# Patient Record
Sex: MALE | Race: WHITE | NOT HISPANIC OR LATINO | Employment: OTHER | URBAN - METROPOLITAN AREA
[De-identification: names, ages, dates, MRNs, and addresses within clinical notes are randomized per-mention and may not be internally consistent; named-entity substitution may affect disease eponyms.]

---

## 2017-09-26 ENCOUNTER — OFFICE VISIT (OUTPATIENT)
Dept: ORTHOPEDICS | Facility: CLINIC | Age: 62
End: 2017-09-26
Payer: COMMERCIAL

## 2017-09-26 VITALS
HEIGHT: 68 IN | BODY MASS INDEX: 25.31 KG/M2 | DIASTOLIC BLOOD PRESSURE: 80 MMHG | HEART RATE: 78 BPM | SYSTOLIC BLOOD PRESSURE: 152 MMHG | WEIGHT: 167 LBS

## 2017-09-26 DIAGNOSIS — M25.531 ACUTE PAIN OF RIGHT WRIST: Primary | ICD-10-CM

## 2017-09-26 PROCEDURE — 99212 OFFICE O/P EST SF 10 MIN: CPT | Mod: ,,, | Performed by: ORTHOPAEDIC SURGERY

## 2017-09-26 PROCEDURE — 3008F BODY MASS INDEX DOCD: CPT | Mod: ,,, | Performed by: ORTHOPAEDIC SURGERY

## 2017-09-26 PROCEDURE — 73110 X-RAY EXAM OF WRIST: CPT | Mod: RT,,, | Performed by: ORTHOPAEDIC SURGERY

## 2017-09-26 RX ORDER — GABAPENTIN 300 MG/1
300 CAPSULE ORAL 2 TIMES DAILY
COMMUNITY
End: 2018-04-27 | Stop reason: SDUPTHER

## 2017-09-26 RX ORDER — TRAMADOL HYDROCHLORIDE 50 MG/1
50 TABLET ORAL EVERY 12 HOURS PRN
Status: ON HOLD | COMMUNITY
End: 2020-06-02

## 2017-09-26 NOTE — PROGRESS NOTES
Subjective:       Chief Complaint    Chief Complaint   Patient presents with    Wrist Pain     Pain started approx. on 9/10/17 with no known injury. Hx of arthrodesis of the radiocarpal joint right wrist with Synthes contoured wrist fusion on 1/17/2011 by Dr. Baez.  Area was very sensitive to touch and painful.  Still has some pain but it has lessened in the 2 weeks since he made his appointment.  No recent xrays.        HPI  Clifford Tolbert is a 61 y.o.  male who presentsPain in his right wrist which was initially 8/10 pain level but is now is now 0/10 pain level.       Past History  Past Medical History:   Diagnosis Date    Depression with anxiety     Neuritis     Peroneal tendinitis     Plantar fasciitis, left     Stiffness of right shoulder joint      Past Surgical History:   Procedure Laterality Date    ANKLE LIGAMENT RECONSTRUCTION Left 03/13/2008    SHOULDER ARTHROSCOPY W/ LABRAL REPAIR Right 02/08/2017    WRIST FUSION Right 01/17/2011     Social History     Social History    Marital status:      Spouse name: N/A    Number of children: N/A    Years of education: N/A     Occupational History    Not on file.     Social History Main Topics    Smoking status: Current Every Day Smoker     Types: Pipe    Smokeless tobacco: Never Used    Alcohol use Yes    Drug use: No    Sexual activity: Not on file     Other Topics Concern    Not on file     Social History Narrative    No narrative on file         Medications  Current Outpatient Prescriptions   Medication Sig    gabapentin (NEURONTIN) 300 MG capsule Take 300 mg by mouth 2 (two) times daily.    tramadol (ULTRAM) 50 mg tablet Take 50 mg by mouth every 12 (twelve) hours as needed for Pain.     No current facility-administered medications for this visit.        Allergies  Review of patient's allergies indicates:   Allergen Reactions    Tetracyclines Hives         Review of Systems     Constitutional: Negative    HENT: Negative  Eyes:  Negative  Respiratory: Negative  Cardiovascular: Negative  Musculoskeletal: HPI  Skin: Negative  Neurological: Negative  Hematological: Negative  Endocrine: Negative      Physical Exam    Vitals:    09/26/17 1141   BP: (!) 152/80   Pulse: 78     Physical Examination:Right wrist shows solid arthrodesis. Midline scar. The site of maximal pain was at the wrist joint level directly over the plate. There is no swelling or redness. He did have a little area of skin avulsion between the fourth and fifth metacarpal heads that healed up uneventfully. Don't see any connection.     Skin-clear  General appearance -  well appearing, and in no distress  Mental status - awake  Neck - supple  Chest -  symmetric air entry  Heart - normal rate   Abdomen - soft      Assessment/Plan   Acute pain of right wrist  -     X-Ray Wrist Complete Right      The next time the pain recurs, he will call me directly so we can see him sooner.         This note was dictated using voice recognition software and may contain grammatical errors.

## 2018-03-07 ENCOUNTER — OFFICE VISIT (OUTPATIENT)
Dept: ORTHOPEDICS | Facility: CLINIC | Age: 63
End: 2018-03-07
Payer: COMMERCIAL

## 2018-03-07 VITALS
SYSTOLIC BLOOD PRESSURE: 142 MMHG | DIASTOLIC BLOOD PRESSURE: 78 MMHG | HEIGHT: 68 IN | BODY MASS INDEX: 25.31 KG/M2 | WEIGHT: 167 LBS | HEART RATE: 74 BPM

## 2018-03-07 DIAGNOSIS — M65.332 TRIGGER MIDDLE FINGER OF LEFT HAND: Primary | ICD-10-CM

## 2018-03-07 PROCEDURE — 99214 OFFICE O/P EST MOD 30 MIN: CPT | Mod: 25,,, | Performed by: ORTHOPAEDIC SURGERY

## 2018-03-07 PROCEDURE — 20550 NJX 1 TENDON SHEATH/LIGAMENT: CPT | Mod: F2,,, | Performed by: ORTHOPAEDIC SURGERY

## 2018-03-07 RX ORDER — TRIAMCINOLONE ACETONIDE 40 MG/ML
40 INJECTION, SUSPENSION INTRA-ARTICULAR; INTRAMUSCULAR
Status: DISCONTINUED | OUTPATIENT
Start: 2018-03-07 | End: 2018-03-07 | Stop reason: HOSPADM

## 2018-03-07 RX ADMIN — TRIAMCINOLONE ACETONIDE 40 MG: 40 INJECTION, SUSPENSION INTRA-ARTICULAR; INTRAMUSCULAR at 04:03

## 2018-03-07 NOTE — PROCEDURES
Tendon Sheath  Date/Time: 3/7/2018 4:27 PM  Performed by: LINDSAY SWENSON JR  Authorized by: LINDSAY SWENSON JR     Consent Done?:  Yes (Verbal)  Timeout: prior to procedure the correct patient, procedure, and site was verified   Indications: Pain  Site marked: the procedure site was marked    Timeout: prior to procedure the correct patient, procedure, and site was verified    Location:  Long finger  Site:  L long MCP (Flexor tendon sheath)  Prep: patient was prepped and draped in usual sterile fashion    Ultrasonic guidance for needle placement?: No    Needle size:  25 G  Approach:  Volar  Medications:  40 mg triamcinolone acetonide 40 mg/mL  Patient tolerance:  Patient tolerated the procedure well with no immediate complications

## 2018-03-07 NOTE — PROGRESS NOTES
Subjective:       Chief Complaint    Chief Complaint   Patient presents with    Hand Pain     NC, left middle finger triggering.  Triggering started approx. 1 1/2 months ago.  Triggering worsens as the day goes on.         HPI  Clifford Tolbert is a 62 y.o.  male who presents With nodular tendinitis and triggering of the left long finger.      Past History  Past Medical History:   Diagnosis Date    Depression with anxiety     Neuritis     Peroneal tendinitis     Plantar fasciitis, left     Stiffness of right shoulder joint      Past Surgical History:   Procedure Laterality Date    ANKLE LIGAMENT RECONSTRUCTION Left 03/13/2008    SHOULDER ARTHROSCOPY W/ LABRAL REPAIR Right 02/08/2017    WRIST FUSION Right 01/17/2011     Social History     Social History    Marital status:      Spouse name: N/A    Number of children: N/A    Years of education: N/A     Occupational History    Not on file.     Social History Main Topics    Smoking status: Current Every Day Smoker     Types: Pipe    Smokeless tobacco: Never Used    Alcohol use Yes    Drug use: No    Sexual activity: Not on file     Other Topics Concern    Not on file     Social History Narrative    No narrative on file         Medications  Current Outpatient Prescriptions   Medication Sig    gabapentin (NEURONTIN) 300 MG capsule Take 300 mg by mouth 2 (two) times daily.    tramadol (ULTRAM) 50 mg tablet Take 50 mg by mouth every 12 (twelve) hours as needed for Pain.     No current facility-administered medications for this visit.        Allergies  Review of patient's allergies indicates:   Allergen Reactions    Tetracyclines Hives         Review of Systems     Constitutional: Negative    HENT: Negative  Eyes: Negative  Respiratory: Negative  Cardiovascular: Negative  Musculoskeletal: HPI  Skin: Negative  Neurological: Negative  Hematological: Negative  Endocrine: Negative      Physical Exam    Vitals:    03/07/18 1517   BP: (!) 142/78    Pulse: 74     Physical Examination:Left hand- Able to make a complete fist. Nodular tendinitis with Triggering and tenderness is present.     Skin-  General appearance -  well appearing, and in no distress  Mental status - awake  Neck - supple  Chest -  symmetric air entry  Heart - normal rate   Abdomen - soft      Assessment/Plan   Trigger middle finger of left hand            This note was dictated using voice recognition software and may contain grammatical errors.

## 2018-04-27 DIAGNOSIS — M65.332 TRIGGER MIDDLE FINGER OF LEFT HAND: Primary | ICD-10-CM

## 2018-04-27 RX ORDER — GABAPENTIN 300 MG/1
300 CAPSULE ORAL 3 TIMES DAILY
Qty: 90 CAPSULE | Refills: 5 | Status: SHIPPED | OUTPATIENT
Start: 2018-04-27

## 2018-06-13 ENCOUNTER — OFFICE VISIT (OUTPATIENT)
Dept: ORTHOPEDICS | Facility: CLINIC | Age: 63
End: 2018-06-13
Payer: COMMERCIAL

## 2018-06-13 VITALS
HEART RATE: 73 BPM | HEIGHT: 68 IN | DIASTOLIC BLOOD PRESSURE: 80 MMHG | SYSTOLIC BLOOD PRESSURE: 158 MMHG | BODY MASS INDEX: 25.31 KG/M2 | WEIGHT: 167 LBS

## 2018-06-13 DIAGNOSIS — S62.352A CLOSED NONDISPLACED FRACTURE OF SHAFT OF THIRD METACARPAL BONE OF RIGHT HAND, INITIAL ENCOUNTER: Primary | ICD-10-CM

## 2018-06-13 PROCEDURE — 99214 OFFICE O/P EST MOD 30 MIN: CPT | Mod: 25,,, | Performed by: ORTHOPAEDIC SURGERY

## 2018-06-13 PROCEDURE — 29130 APPL FINGER SPLINT STATIC: CPT | Mod: 59,RT,, | Performed by: ORTHOPAEDIC SURGERY

## 2018-06-13 PROCEDURE — 3008F BODY MASS INDEX DOCD: CPT | Mod: ,,, | Performed by: ORTHOPAEDIC SURGERY

## 2018-06-13 PROCEDURE — 29126 APPL SHORT ARM SPLINT DYN: CPT | Mod: RT,,, | Performed by: ORTHOPAEDIC SURGERY

## 2018-06-13 RX ORDER — IBUPROFEN 200 MG/1
200 TABLET, COATED ORAL EVERY 6 HOURS PRN
COMMUNITY

## 2018-06-13 RX ORDER — HYDROCODONE BITARTRATE AND ACETAMINOPHEN 5; 325 MG/1; MG/1
1 TABLET ORAL EVERY 6 HOURS PRN
Refills: 0 | COMMUNITY
Start: 2018-06-09 | End: 2018-06-13

## 2018-06-13 NOTE — PROGRESS NOTES
Subjective:       Chief Complaint    Chief Complaint   Patient presents with    Hand Injury     NC, right hand fx.  DOI: 6/9/18, while removing a spare tire from his truck, it fell striking his hand.  Previous eval & xrays @ [facility] ER on 6/9/18.  Hx of right wrist fusion on 1/17/11 by Dr. Baez.  Patient is in a short arm splint, ace wrap, & sling.  Painful w/ any movement or touch.  Taking Advil to control pain.  Swelling decreases @ night but comes back during the day.  Icing during the day but doesn't help the swelling.       HPI  Clifford Tolbert is a 62 y.o.  male who presents With a painful swollen right hand. He also has a arthrodesis of the wrist joint. The review of the x-rays shows a transverse fracture nondisplaced at the end of the plate. The middle aspect of the third metacarpal where the plate is attached.. The swelling in his fingers. I think is due to the elastic bandage. It was applied over that the splint      Past History  Past Medical History:   Diagnosis Date    Depression with anxiety     Neuritis     Peroneal tendinitis     Plantar fasciitis, left     Stiffness of right shoulder joint      Past Surgical History:   Procedure Laterality Date    ANKLE LIGAMENT RECONSTRUCTION Left 03/13/2008    SHOULDER ARTHROSCOPY W/ LABRAL REPAIR Right 02/08/2017    WRIST FUSION Right 01/17/2011     Social History     Social History    Marital status:      Spouse name: N/A    Number of children: N/A    Years of education: N/A     Occupational History    Not on file.     Social History Main Topics    Smoking status: Current Every Day Smoker     Types: Pipe    Smokeless tobacco: Never Used    Alcohol use Yes    Drug use: No    Sexual activity: Not on file     Other Topics Concern    Not on file     Social History Narrative    No narrative on file         Medications  Current Outpatient Prescriptions   Medication Sig    gabapentin (NEURONTIN) 300 MG capsule Take 1 capsule (300 mg total)  by mouth 3 (three) times daily.    ibuprofen (ADVIL) 200 MG tablet Take 200 mg by mouth every 6 (six) hours as needed for Pain.    tramadol (ULTRAM) 50 mg tablet Take 50 mg by mouth every 12 (twelve) hours as needed for Pain.     No current facility-administered medications for this visit.        Allergies  Review of patient's allergies indicates:   Allergen Reactions    Tetracyclines Hives         Review of Systems     Constitutional: Negative    HENT: Negative  Eyes: Negative  Respiratory: Negative  Cardiovascular: Negative  Musculoskeletal: HPI  Skin: Negative  Neurological: Negative  Hematological: Negative  Endocrine: Negative      Physical Exam    Vitals:    06/13/18 1345   BP: (!) 158/80   Pulse: 73     Physical Examination:Right hand. There is a solid arthrodesis of the radiocarpal joint. The long finger seems to be tender along the third MCP joint dorsally. The entire hand is a little swollen, but that's due to the bandages that he had on. Exquisite tenderness is present in the mid aspect of the third metacarpal where the blood bruise is located. This is at the end of the plate with the last screw. This is where the transverse fracture is located.     Skin-clear  General appearance -  well appearing, and in no distress  Mental status - awake  Neck - supple  Chest -  symmetric air entry  Heart - normal rate   Abdomen - soft      Assessment/Plan   Closed nondisplaced fracture of shaft of third metacarpal bone of right hand, initial encounter      He is placed in a short arm splint with finger extension incorporating the third and fourth fingers. Advised to buy a shower cast cover from [employer].      This note was dictated using voice recognition software and may contain grammatical errors.

## 2018-07-03 ENCOUNTER — OFFICE VISIT (OUTPATIENT)
Dept: ORTHOPEDICS | Facility: CLINIC | Age: 63
End: 2018-07-03
Payer: COMMERCIAL

## 2018-07-03 VITALS
BODY MASS INDEX: 25.31 KG/M2 | HEIGHT: 68 IN | WEIGHT: 167 LBS | SYSTOLIC BLOOD PRESSURE: 165 MMHG | DIASTOLIC BLOOD PRESSURE: 94 MMHG | HEART RATE: 71 BPM

## 2018-07-03 DIAGNOSIS — S62.352D CLOSED NONDISPLACED FRACTURE OF SHAFT OF THIRD METACARPAL BONE OF RIGHT HAND WITH ROUTINE HEALING, SUBSEQUENT ENCOUNTER: Primary | ICD-10-CM

## 2018-07-03 PROCEDURE — 73130 X-RAY EXAM OF HAND: CPT | Mod: FY,RT,, | Performed by: ORTHOPAEDIC SURGERY

## 2018-07-03 PROCEDURE — 99213 OFFICE O/P EST LOW 20 MIN: CPT | Mod: ,,, | Performed by: ORTHOPAEDIC SURGERY

## 2018-07-03 PROCEDURE — 3008F BODY MASS INDEX DOCD: CPT | Mod: ,,, | Performed by: ORTHOPAEDIC SURGERY

## 2018-07-11 NOTE — PROGRESS NOTES
Subjective:       Chief Complaint    Chief Complaint   Patient presents with    Right Hand - Pain, Fracture     right hand fx.  DOI: 6/9/18, 3 weeks and 3 days post fracture. Patient in Splint. Patient doing well with minimal pain.        HPI  Clifford Tolbert is a 62 y.o.  male who presentsFollow-up on right hand fracture. Appears to be doing well.       Past History  Past Medical History:   Diagnosis Date    Depression with anxiety     Neuritis     Peroneal tendinitis     Plantar fasciitis, left     Stiffness of right shoulder joint      Past Surgical History:   Procedure Laterality Date    ANKLE LIGAMENT RECONSTRUCTION Left 03/13/2008    SHOULDER ARTHROSCOPY W/ LABRAL REPAIR Right 02/08/2017    WRIST FUSION Right 01/17/2011     Social History     Social History    Marital status:      Spouse name: N/A    Number of children: N/A    Years of education: N/A     Occupational History    Not on file.     Social History Main Topics    Smoking status: Current Every Day Smoker     Types: Pipe    Smokeless tobacco: Never Used    Alcohol use Yes    Drug use: No    Sexual activity: Not on file     Other Topics Concern    Not on file     Social History Narrative    No narrative on file         Medications  Current Outpatient Prescriptions   Medication Sig    gabapentin (NEURONTIN) 300 MG capsule Take 1 capsule (300 mg total) by mouth 3 (three) times daily.    ibuprofen (ADVIL) 200 MG tablet Take 200 mg by mouth every 6 (six) hours as needed for Pain.    tramadol (ULTRAM) 50 mg tablet Take 50 mg by mouth every 12 (twelve) hours as needed for Pain.     No current facility-administered medications for this visit.        Allergies  Review of patient's allergies indicates:   Allergen Reactions    Tetracyclines Hives         Review of Systems     Constitutional: Negative    HENT: Negative  Eyes: Negative  Respiratory: Negative  Cardiovascular: Negative  Musculoskeletal: HPI  Skin:  Negative  Neurological: Negative  Hematological: Negative  Endocrine: Negative      Physical Exam    Vitals:    07/03/18 1333   BP: (!) 165/94   Pulse: 71     Physical Examination:Tenderness is present. The mid aspect of the third metacarpal where the fracture was located tip of the fusion plate. His fingers are flexible.     Skin-clear  General appearance -  well appearing, and in no distress  Mental status - awake  Neck - supple  Chest -  symmetric air entry  Heart - normal rate   Abdomen - soft      Assessment/Plan   Closed nondisplaced fracture of shaft of third metacarpal bone of right hand with routine healing, subsequent encounter  -     X-Ray Hand 3 view Right      Continue with the splinting. Follow-up as scheduled. Fracture is healing well.      This note was dictated using voice recognition software and may contain grammatical errors.

## 2018-07-18 ENCOUNTER — OFFICE VISIT (OUTPATIENT)
Dept: ORTHOPEDICS | Facility: CLINIC | Age: 63
End: 2018-07-18
Payer: COMMERCIAL

## 2018-07-18 VITALS — WEIGHT: 167 LBS | BODY MASS INDEX: 25.31 KG/M2 | HEIGHT: 68 IN

## 2018-07-18 DIAGNOSIS — S62.352D CLOSED NONDISPLACED FRACTURE OF SHAFT OF THIRD METACARPAL BONE OF RIGHT HAND WITH ROUTINE HEALING, SUBSEQUENT ENCOUNTER: Primary | ICD-10-CM

## 2018-07-18 PROCEDURE — 99212 OFFICE O/P EST SF 10 MIN: CPT | Mod: ,,, | Performed by: ORTHOPAEDIC SURGERY

## 2018-07-18 PROCEDURE — 3008F BODY MASS INDEX DOCD: CPT | Mod: ,,, | Performed by: ORTHOPAEDIC SURGERY

## 2018-07-18 NOTE — PROGRESS NOTES
Subjective:       Chief Complaint    Chief Complaint   Patient presents with    Follow-up     right hand fx.  DOI: 6/9/18, 5 weeks & 4 days, while removing a spare tire from his truck, it fell striking his hand.  Patient is in a splint.  He reports no pain or issues.       HPI  Clifford Tolbert is a 62 y.o.  male who presents Follow-up fracture third metacarpal right hand. Almost 6 weeks. Overall doing well.      Past History  Past Medical History:   Diagnosis Date    Depression with anxiety     Neuritis     Peroneal tendinitis     Plantar fasciitis, left     Stiffness of right shoulder joint      Past Surgical History:   Procedure Laterality Date    ANKLE LIGAMENT RECONSTRUCTION Left 03/13/2008    SHOULDER ARTHROSCOPY W/ LABRAL REPAIR Right 02/08/2017    WRIST FUSION Right 01/17/2011     Social History     Social History    Marital status:      Spouse name: N/A    Number of children: N/A    Years of education: N/A     Occupational History    Not on file.     Social History Main Topics    Smoking status: Current Every Day Smoker     Types: Pipe    Smokeless tobacco: Never Used    Alcohol use Yes    Drug use: No    Sexual activity: Not on file     Other Topics Concern    Not on file     Social History Narrative    No narrative on file         Medications  Current Outpatient Prescriptions   Medication Sig    gabapentin (NEURONTIN) 300 MG capsule Take 1 capsule (300 mg total) by mouth 3 (three) times daily.    ibuprofen (ADVIL) 200 MG tablet Take 200 mg by mouth every 6 (six) hours as needed for Pain.    tramadol (ULTRAM) 50 mg tablet Take 50 mg by mouth every 12 (twelve) hours as needed for Pain.     No current facility-administered medications for this visit.        Allergies  Review of patient's allergies indicates:   Allergen Reactions    Tetracyclines Hives         Review of Systems     Constitutional: Negative    HENT: Negative  Eyes: Negative  Respiratory: Negative  Cardiovascular:  Negative  Musculoskeletal: HPI  Skin: Negative  Neurological: Negative  Hematological: Negative  Endocrine: Negative      Physical Exam    There were no vitals filed for this visit.  Physical Examination:Examination of the right hand reveals no tenderness over the fracture site at the end of the fusion plate at the mid aspect of the third metacarpal dorsally. Able to make a complete fist due to some stiffness.     Skin-  General appearance -  well appearing, and in no distress  Mental status - awake  Neck - supple  Chest -  symmetric air entry  Heart - normal rate   Abdomen - soft      Assessment/Plan   Closed nondisplaced fracture of shaft of third metacarpal bone of right hand with routine healing, subsequent encounter          Discontinue the use of the splint, begin range of motion exercises and stretching. Sections discussed.  This note was dictated using voice recognition software and may contain grammatical errors.

## 2018-08-21 ENCOUNTER — OFFICE VISIT (OUTPATIENT)
Dept: ORTHOPEDICS | Facility: CLINIC | Age: 63
End: 2018-08-21
Payer: COMMERCIAL

## 2018-08-21 VITALS — HEIGHT: 68 IN | BODY MASS INDEX: 25.31 KG/M2 | WEIGHT: 167 LBS

## 2018-08-21 DIAGNOSIS — S62.352D CLOSED NONDISPLACED FRACTURE OF SHAFT OF THIRD METACARPAL BONE OF RIGHT HAND WITH ROUTINE HEALING, SUBSEQUENT ENCOUNTER: Primary | ICD-10-CM

## 2018-08-21 PROCEDURE — 99212 OFFICE O/P EST SF 10 MIN: CPT | Mod: ,,, | Performed by: ORTHOPAEDIC SURGERY

## 2018-08-21 PROCEDURE — 3008F BODY MASS INDEX DOCD: CPT | Mod: ,,, | Performed by: ORTHOPAEDIC SURGERY

## 2018-08-21 RX ORDER — MESALAMINE 1000 MG/1
500 SUPPOSITORY RECTAL DAILY PRN
COMMUNITY
End: 2024-02-07

## 2018-08-21 RX ORDER — MESALAMINE 1.2 G/1
1.2 TABLET, DELAYED RELEASE ORAL DAILY PRN
COMMUNITY

## 2018-08-21 NOTE — PROGRESS NOTES
Subjective:       Chief Complaint    Chief Complaint   Patient presents with    Follow-up       HPI  Clifford Tolbert is a 62 y.o.  male who presents follow-up fractured third metacarpal right hand. His biggest complaint is tightness in the third and fourth PIP joints      Past History  Past Medical History:   Diagnosis Date    Depression with anxiety     Neuritis     Peroneal tendinitis     Plantar fasciitis, left     Stiffness of right shoulder joint      Past Surgical History:   Procedure Laterality Date    ANKLE LIGAMENT RECONSTRUCTION Left 03/13/2008    SHOULDER ARTHROSCOPY W/ LABRAL REPAIR Right 02/08/2017    WRIST FUSION Right 01/17/2011     Social History     Socioeconomic History    Marital status:      Spouse name: Not on file    Number of children: Not on file    Years of education: Not on file    Highest education level: Not on file   Social Needs    Financial resource strain: Not on file    Food insecurity - worry: Not on file    Food insecurity - inability: Not on file    Transportation needs - medical: Not on file    Transportation needs - non-medical: Not on file   Occupational History    Not on file   Tobacco Use    Smoking status: Current Every Day Smoker     Types: Pipe    Smokeless tobacco: Never Used   Substance and Sexual Activity    Alcohol use: Yes    Drug use: No    Sexual activity: Not on file   Other Topics Concern    Not on file   Social History Narrative    Not on file         Medications  Current Outpatient Medications   Medication Sig    gabapentin (NEURONTIN) 300 MG capsule Take 1 capsule (300 mg total) by mouth 3 (three) times daily.    ibuprofen (ADVIL) 200 MG tablet Take 200 mg by mouth every 6 (six) hours as needed for Pain.    mesalamine (CANASA) 1000 MG Supp Place 500 mg rectally daily as needed.    mesalamine (LIALDA) 1.2 gram TbEC Take 1.2 g by mouth daily as needed.    tramadol (ULTRAM) 50 mg tablet Take 50 mg by mouth every 12 (twelve)  hours as needed for Pain.     No current facility-administered medications for this visit.        Allergies  Review of patient's allergies indicates:   Allergen Reactions    Tetracyclines Hives         Review of Systems     Constitutional: Negative    HENT: Negative  Eyes: Negative  Respiratory: Negative  Cardiovascular: Negative  Musculoskeletal: HPI  Skin: Negative  Neurological: Negative  Hematological: Negative  Endocrine: Negative      Physical Exam    There were no vitals filed for this visit.  Physical Examination: Right hand exam reveals no tenderness at the fracture site distal to the plate at the third metacarpal dorsally. He is able to make a complete tight fist with full extension.     Skin-clear  General appearance -  well appearing, and in no distress  Mental status - awake  Neck - supple  Chest -  symmetric air entry  Heart - normal rate   Abdomen - soft      Assessment/Plan   Closed nondisplaced fracture of shaft of third metacarpal bone of right hand with routine healing, subsequent encounter        Residual tightness in the third and fourth PIP joints will go away with time.    This note was dictated using voice recognition software and may contain grammatical errors.

## 2018-12-20 ENCOUNTER — OFFICE VISIT (OUTPATIENT)
Dept: ORTHOPEDICS | Facility: CLINIC | Age: 63
End: 2018-12-20
Payer: COMMERCIAL

## 2018-12-20 VITALS
HEIGHT: 68 IN | BODY MASS INDEX: 27.28 KG/M2 | SYSTOLIC BLOOD PRESSURE: 180 MMHG | HEART RATE: 85 BPM | DIASTOLIC BLOOD PRESSURE: 80 MMHG | WEIGHT: 180 LBS

## 2018-12-20 DIAGNOSIS — M65.332 TRIGGER FINGER, LEFT MIDDLE FINGER: Primary | ICD-10-CM

## 2018-12-20 PROCEDURE — 20550 NJX 1 TENDON SHEATH/LIGAMENT: CPT | Mod: F2,,, | Performed by: ORTHOPAEDIC SURGERY

## 2018-12-20 PROCEDURE — 3008F BODY MASS INDEX DOCD: CPT | Mod: ,,, | Performed by: ORTHOPAEDIC SURGERY

## 2018-12-20 PROCEDURE — 73140 X-RAY EXAM OF FINGER(S): CPT | Mod: F2,,, | Performed by: ORTHOPAEDIC SURGERY

## 2018-12-20 PROCEDURE — 99213 OFFICE O/P EST LOW 20 MIN: CPT | Mod: 25,,, | Performed by: ORTHOPAEDIC SURGERY

## 2018-12-20 RX ORDER — METHYLPREDNISOLONE ACETATE 40 MG/ML
40 INJECTION, SUSPENSION INTRA-ARTICULAR; INTRALESIONAL; INTRAMUSCULAR; SOFT TISSUE
Status: DISCONTINUED | OUTPATIENT
Start: 2018-12-20 | End: 2018-12-20 | Stop reason: HOSPADM

## 2018-12-20 RX ADMIN — METHYLPREDNISOLONE ACETATE 40 MG: 40 INJECTION, SUSPENSION INTRA-ARTICULAR; INTRALESIONAL; INTRAMUSCULAR; SOFT TISSUE at 01:12

## 2018-12-20 NOTE — PROCEDURES
Tendon Sheath: L long MCP  Date/Time: 12/20/2018 1:12 PM  Performed by: Rory Carter MD  Authorized by: Rory Carter MD     Consent Done?: Yes (Verbal)  Timeout: prior to procedure the correct patient, procedure, and site was verified   Indications:  Pain  Site marked: the procedure site was marked    Timeout: prior to procedure the correct patient, procedure, and site was verified    Location:  Long finger  Site:  L long MCP  Prep: patient was prepped and draped in usual sterile fashion    Ultrasonic guidance for needle placement?: No    Needle size:  25 G  Medications:  40 mg methylPREDNISolone acetate 40 mg/mL  Patient tolerance:  Patient tolerated the procedure well with no immediate complications

## 2018-12-20 NOTE — PROGRESS NOTES
[facility]    Subjective:     Chief Complaint:   Chief Complaint   Patient presents with    Left Hand - Pain     r middle finger left, triggering x 1 month       Past Medical History:   Diagnosis Date    Depression with anxiety     Neuritis     Peroneal tendinitis     Plantar fasciitis, left     Stiffness of right shoulder joint        Past Surgical History:   Procedure Laterality Date    ANKLE LIGAMENT RECONSTRUCTION Left 03/13/2008    SHOULDER ARTHROSCOPY W/ LABRAL REPAIR Right 02/08/2017    WRIST FUSION Right 01/17/2011       Current Outpatient Medications   Medication Sig    gabapentin (NEURONTIN) 300 MG capsule Take 1 capsule (300 mg total) by mouth 3 (three) times daily.    ibuprofen (ADVIL) 200 MG tablet Take 200 mg by mouth every 6 (six) hours as needed for Pain.    mesalamine (CANASA) 1000 MG Supp Place 500 mg rectally daily as needed.    mesalamine (LIALDA) 1.2 gram TbEC Take 1.2 g by mouth daily as needed.    tramadol (ULTRAM) 50 mg tablet Take 50 mg by mouth every 12 (twelve) hours as needed for Pain.     No current facility-administered medications for this visit.        Review of patient's allergies indicates:   Allergen Reactions    Tetracyclines Hives       Family History   Problem Relation Age of Onset    Hypertension Father     Heart disease Father        Social History     Socioeconomic History    Marital status:      Spouse name: Not on file    Number of children: Not on file    Years of education: Not on file    Highest education level: Not on file   Social Needs    Financial resource strain: Not on file    Food insecurity - worry: Not on file    Food insecurity - inability: Not on file    Transportation needs - medical: Not on file    Transportation needs - non-medical: Not on file   Occupational History    Not on file   Tobacco Use    Smoking status: Never Smoker    Smokeless tobacco: Never Used   Substance and Sexual Activity    Alcohol use:  Yes    Drug use: No    Sexual activity: Not on file   Other Topics Concern    Not on file   Social History Narrative    Not on file       History of present illness: Patient comes in today for the left hand. He's got left long finger triggering. It has been injected once before about 6 months ago. Continues to have significant discomfort.      Review of Systems:    Constitution: Negative for chills, fever, and sweats.  Negative for unexplained weight loss.    HENT:  Negative for headaches and blurry vision.    Cardiovascular:Negative for chest pain or irregular heart beat. Negative for hypertension.    Respiratory:  Negative for cough and shortness of breath.    Gastrointestinal: Negative for abdominal pain, heartburn, melena, nausea, and vomitting.    Genitourinary:  Negative bladder incontinence and dysuria.    Musculoskeletal:  See HPI for details.     Neurological: Negative for numbness.    Psychiatric/Behavioral: Negative for depression.  The patient is not nervous/anxious.      Endocrine: Negative for polyuria    Hematologic/Lymphatic: Negative for bleeding problem.  Does not bruise/bleed easily.    Skin: Negative for poor would healing and rash    Objective:      Physical Examination:    Vital Signs:    Vitals:    12/20/18 1256   BP: (!) 180/80   Pulse: 85       Body mass index is 27.37 kg/m².    This a well-developed, well nourished patient in no acute distress.  They are alert and oriented and cooperative to examination.        Patient has from triggering of the left hand. Full range of motion of the right hand. No triggering anywhere on the right side. The left long is the only digit triggering. Tinel's is negative. Negative Finkelstein's bilaterally  Pertinent New Results:    XRAY Report / Interpretation:   AP and lateral of the left long finger is within normal limits no fractures or subluxations    Assessment/Plan:      Left hand trigger finger. I injected the left long trigger finger with  Depo-Medrol and lidocaine. Follow-up if he has persistent triggering.  This note was created using Dragon voice recognition software that occasionally misinterpreted phrases or words.

## 2020-05-29 ENCOUNTER — HOSPITAL ENCOUNTER (OUTPATIENT)
Dept: PREADMISSION TESTING | Facility: HOSPITAL | Age: 65
Discharge: HOME | End: 2020-05-29
Attending: ORTHOPAEDIC SURGERY
Payer: COMMERCIAL

## 2020-05-29 ENCOUNTER — LAB VISIT (OUTPATIENT)
Dept: LAB | Facility: HOSPITAL | Age: 65
End: 2020-05-29
Attending: ORTHOPAEDIC SURGERY
Payer: COMMERCIAL

## 2020-05-29 ENCOUNTER — HOSPITAL ENCOUNTER (OUTPATIENT)
Dept: RADIOLOGY | Facility: HOSPITAL | Age: 65
Discharge: HOME | End: 2020-05-29
Attending: ORTHOPAEDIC SURGERY
Payer: COMMERCIAL

## 2020-05-29 VITALS
HEART RATE: 72 BPM | RESPIRATION RATE: 18 BRPM | DIASTOLIC BLOOD PRESSURE: 92 MMHG | BODY MASS INDEX: 25.39 KG/M2 | HEIGHT: 68 IN | TEMPERATURE: 98 F | WEIGHT: 167.56 LBS | SYSTOLIC BLOOD PRESSURE: 170 MMHG | OXYGEN SATURATION: 97 %

## 2020-05-29 DIAGNOSIS — Z01.818 PRE-OP TESTING: Primary | ICD-10-CM

## 2020-05-29 DIAGNOSIS — M65.332 TRIGGER FINGER, LEFT MIDDLE FINGER: Primary | ICD-10-CM

## 2020-05-29 DIAGNOSIS — Z01.818 PREOPERATIVE TESTING: Primary | ICD-10-CM

## 2020-05-29 DIAGNOSIS — M65.332 TRIGGER FINGER, LEFT MIDDLE FINGER: ICD-10-CM

## 2020-05-29 LAB
ALBUMIN SERPL BCP-MCNC: 3.7 G/DL (ref 3.5–5.2)
ALP SERPL-CCNC: 51 U/L (ref 55–135)
ALT SERPL W/O P-5'-P-CCNC: 22 U/L (ref 10–44)
ANION GAP SERPL CALC-SCNC: 7 MMOL/L (ref 8–16)
AST SERPL-CCNC: 23 U/L (ref 10–40)
BASOPHILS # BLD AUTO: 0.04 K/UL (ref 0–0.2)
BASOPHILS NFR BLD: 0.4 % (ref 0–1.9)
BILIRUB SERPL-MCNC: 0.7 MG/DL (ref 0.1–1)
BILIRUB UR QL STRIP: NEGATIVE
BUN SERPL-MCNC: 21 MG/DL (ref 8–23)
CALCIUM SERPL-MCNC: 8.8 MG/DL (ref 8.7–10.5)
CHLORIDE SERPL-SCNC: 106 MMOL/L (ref 95–110)
CLARITY UR: CLEAR
CO2 SERPL-SCNC: 28 MMOL/L (ref 23–29)
COLOR UR: YELLOW
CREAT SERPL-MCNC: 1.1 MG/DL (ref 0.5–1.4)
DIFFERENTIAL METHOD: NORMAL
EOSINOPHIL # BLD AUTO: 0.2 K/UL (ref 0–0.5)
EOSINOPHIL NFR BLD: 2.3 % (ref 0–8)
ERYTHROCYTE [DISTWIDTH] IN BLOOD BY AUTOMATED COUNT: 13.7 % (ref 11.5–14.5)
EST. GFR  (AFRICAN AMERICAN): >60 ML/MIN/1.73 M^2
EST. GFR  (NON AFRICAN AMERICAN): >60 ML/MIN/1.73 M^2
GLUCOSE SERPL-MCNC: 111 MG/DL (ref 70–110)
GLUCOSE UR QL STRIP: NEGATIVE
HCT VFR BLD AUTO: 44.7 % (ref 40–54)
HGB BLD-MCNC: 14.3 G/DL (ref 14–18)
HGB UR QL STRIP: NEGATIVE
IMM GRANULOCYTES # BLD AUTO: 0.04 K/UL (ref 0–0.04)
IMM GRANULOCYTES NFR BLD AUTO: 0.4 % (ref 0–0.5)
KETONES UR QL STRIP: NEGATIVE
LEUKOCYTE ESTERASE UR QL STRIP: NEGATIVE
LYMPHOCYTES # BLD AUTO: 3.9 K/UL (ref 1–4.8)
LYMPHOCYTES NFR BLD: 37.8 % (ref 18–48)
MCH RBC QN AUTO: 28.8 PG (ref 27–31)
MCHC RBC AUTO-ENTMCNC: 32 G/DL (ref 32–36)
MCV RBC AUTO: 90 FL (ref 82–98)
MONOCYTES # BLD AUTO: 0.8 K/UL (ref 0.3–1)
MONOCYTES NFR BLD: 7.4 % (ref 4–15)
NEUTROPHILS # BLD AUTO: 5.3 K/UL (ref 1.8–7.7)
NEUTROPHILS NFR BLD: 51.7 % (ref 38–73)
NITRITE UR QL STRIP: NEGATIVE
NRBC BLD-RTO: 0 /100 WBC
PH UR STRIP: 6 [PH] (ref 5–8)
PLATELET # BLD AUTO: 272 K/UL (ref 150–350)
PMV BLD AUTO: 10.6 FL (ref 9.2–12.9)
POTASSIUM SERPL-SCNC: 4.7 MMOL/L (ref 3.5–5.1)
PROT SERPL-MCNC: 6.8 G/DL (ref 6–8.4)
PROT UR QL STRIP: NEGATIVE
RBC # BLD AUTO: 4.97 M/UL (ref 4.6–6.2)
SODIUM SERPL-SCNC: 141 MMOL/L (ref 136–145)
SP GR UR STRIP: 1.01 (ref 1–1.03)
URN SPEC COLLECT METH UR: NORMAL
UROBILINOGEN UR STRIP-ACNC: NEGATIVE EU/DL
WBC # BLD AUTO: 10.28 K/UL (ref 3.9–12.7)

## 2020-05-29 PROCEDURE — 85025 COMPLETE CBC W/AUTO DIFF WBC: CPT

## 2020-05-29 PROCEDURE — 36415 COLL VENOUS BLD VENIPUNCTURE: CPT

## 2020-05-29 PROCEDURE — 81003 URINALYSIS AUTO W/O SCOPE: CPT

## 2020-05-29 PROCEDURE — 80053 COMPREHEN METABOLIC PANEL: CPT

## 2020-05-29 PROCEDURE — 71046 X-RAY EXAM CHEST 2 VIEWS: CPT | Mod: TC

## 2020-05-29 PROCEDURE — 93005 ELECTROCARDIOGRAM TRACING: CPT | Performed by: INTERNAL MEDICINE

## 2020-05-29 RX ORDER — NAPROXEN SODIUM 220 MG
220 TABLET ORAL
COMMUNITY
End: 2024-02-07

## 2020-05-29 NOTE — DISCHARGE INSTRUCTIONS
To confirm, Your doctor has instructed you that surgery is scheduled for: June 2     Pre-Op will call the afternoon prior to surgery between 4:00 and 6:00 PM with the final arrival time.      Enter at [facility] and come through front entrance.  You will be screened/ have temperature checked.    You may have 1 visitor who will also be screened.     Check in at registration.   After registration, proceed past gift shop and through glass door ( [facility] [place]) Check in at the nurses station to the left.   Do not eat or drink anything after midnight the night before your surgery - THIS INCLUDES  WATER, GUM, MINTS AND CANDY.  YOU MAY BRUSH YOUR TEETH BUT DO NOT SWALLOW     TAKE ONLY THESE MEDICATIONS WITH A SMALL SIP OF WATER THE MORNING OF YOUR PROCEDURE: NONE      PLEASE NOTE:  The surgery schedule has many variables which may affect the time of your surgery case.  Family members should be available if your surgery time changes.  Plan to be here the day of your procedure between 4-6 hours.      DO NOT TAKE THESE MEDICATIONS 5-7 DAYS PRIOR to your procedure or per your surgeon's request: ASPIRIN, ALEVE, ADVIL, IBUPROFEN,  DEVON SELTZER, BC , FISH OIL , VITAMIN E, HERBALS  (May take Tylenol)                                                      IMPORTANT INSTRUCTIONS      · Do not smoke, vape or drink alcoholic beverages 24 hours prior to your procedure.  · Shower the night before AND the morning of your procedure with a Chlorhexidine wash such as Hibiclens or Dial antibacterial soap from the neck down.   ·  Do not get it on your face or in your eyes.  You may use your own shampoo and face wash. This helps your skin to be as bacteria free as possible.   ·  DO NOT remove hair from the surgery site.  Do not shave the incision site unless you are given specific instructions to do so.    · Sleep in a bed with clean sheets.  Do not sleep with a pet in the bed.   · If you wear contact lenses, dentures, hearing aids or  glasses, bring a container to put them in during surgery and give to a family member for safe keeping.    · Please leave all jewelry, piercing's and valuables at home.   · Wear rubber soled shoes (no flip flops).  · If your doctor has scheduled you for an overnight stay, bring a small overnight bag with any personal items you need.    · Make arrangements in advance for transportation home by a responsible adult.      · You must make arrangements for transportation, TAXI'S, UBER'S OR LYFTS ARE NOT ALLOWED.        If you have any questions about these instructions, call (Monday - Friday) Pre-Op Admit  Nursing  at [phone] or the Pre-Op Day Surgery Unit at [phone].

## 2020-06-01 ENCOUNTER — HOSPITAL ENCOUNTER (OUTPATIENT)
Dept: PREADMISSION TESTING | Facility: HOSPITAL | Age: 65
Discharge: HOME | End: 2020-06-01
Attending: ORTHOPAEDIC SURGERY
Payer: COMMERCIAL

## 2020-06-01 DIAGNOSIS — Z01.818 PRE-OP TESTING: ICD-10-CM

## 2020-06-01 PROCEDURE — U0003 INFECTIOUS AGENT DETECTION BY NUCLEIC ACID (DNA OR RNA); SEVERE ACUTE RESPIRATORY SYNDROME CORONAVIRUS 2 (SARS-COV-2) (CORONAVIRUS DISEASE [COVID-19]), AMPLIFIED PROBE TECHNIQUE, MAKING USE OF HIGH THROUGHPUT TECHNOLOGIES AS DESCRIBED BY CMS-2020-01-R: HCPCS

## 2020-06-02 ENCOUNTER — HOSPITAL ENCOUNTER (OUTPATIENT)
Facility: HOSPITAL | Age: 65
Discharge: HOME | End: 2020-06-02
Attending: ORTHOPAEDIC SURGERY | Admitting: ORTHOPAEDIC SURGERY
Payer: COMMERCIAL

## 2020-06-02 ENCOUNTER — ANESTHESIA (OUTPATIENT)
Dept: SURGERY | Facility: HOSPITAL | Age: 65
End: 2020-06-02
Payer: COMMERCIAL

## 2020-06-02 ENCOUNTER — ANESTHESIA EVENT (OUTPATIENT)
Dept: SURGERY | Facility: HOSPITAL | Age: 65
End: 2020-06-02
Payer: COMMERCIAL

## 2020-06-02 VITALS
HEART RATE: 64 BPM | WEIGHT: 174 LBS | TEMPERATURE: 98 F | HEIGHT: 68 IN | SYSTOLIC BLOOD PRESSURE: 158 MMHG | OXYGEN SATURATION: 97 % | RESPIRATION RATE: 16 BRPM | DIASTOLIC BLOOD PRESSURE: 84 MMHG | BODY MASS INDEX: 26.37 KG/M2

## 2020-06-02 DIAGNOSIS — M65.332 TRIGGER FINGER, LEFT MIDDLE FINGER: Primary | ICD-10-CM

## 2020-06-02 LAB — SARS-COV-2 RNA RESP QL NAA+PROBE: NOT DETECTED

## 2020-06-02 PROCEDURE — 27201423 OPTIME MED/SURG SUP & DEVICES STERILE SUPPLY: Performed by: ORTHOPAEDIC SURGERY

## 2020-06-02 PROCEDURE — C9290 INJ, BUPIVACAINE LIPOSOME: HCPCS | Performed by: ORTHOPAEDIC SURGERY

## 2020-06-02 PROCEDURE — 27000654 HC CATH IV JELCO: Performed by: NURSE ANESTHETIST, CERTIFIED REGISTERED

## 2020-06-02 PROCEDURE — 63600175 PHARM REV CODE 636 W HCPCS: Performed by: NURSE ANESTHETIST, CERTIFIED REGISTERED

## 2020-06-02 PROCEDURE — 63600175 PHARM REV CODE 636 W HCPCS: Performed by: ORTHOPAEDIC SURGERY

## 2020-06-02 PROCEDURE — 37000008 HC ANESTHESIA 1ST 15 MINUTES: Performed by: ORTHOPAEDIC SURGERY

## 2020-06-02 PROCEDURE — 71000015 HC POSTOP RECOV 1ST HR: Performed by: ORTHOPAEDIC SURGERY

## 2020-06-02 PROCEDURE — 25000003 PHARM REV CODE 250: Performed by: ANESTHESIOLOGY

## 2020-06-02 PROCEDURE — 36000707: Performed by: ORTHOPAEDIC SURGERY

## 2020-06-02 PROCEDURE — 36000706: Performed by: ORTHOPAEDIC SURGERY

## 2020-06-02 PROCEDURE — 27000673 HC TUBING BLOOD Y: Performed by: NURSE ANESTHETIST, CERTIFIED REGISTERED

## 2020-06-02 PROCEDURE — 27000284 HC CANNULA NASAL: Performed by: NURSE ANESTHETIST, CERTIFIED REGISTERED

## 2020-06-02 PROCEDURE — 25000003 PHARM REV CODE 250: Performed by: ORTHOPAEDIC SURGERY

## 2020-06-02 PROCEDURE — 25000003 PHARM REV CODE 250: Performed by: STUDENT IN AN ORGANIZED HEALTH CARE EDUCATION/TRAINING PROGRAM

## 2020-06-02 PROCEDURE — 27000650 HC AIRWAY EXCHANGE: Performed by: NURSE ANESTHETIST, CERTIFIED REGISTERED

## 2020-06-02 PROCEDURE — 27000671 HC TUBING MICROBORE EXT: Performed by: NURSE ANESTHETIST, CERTIFIED REGISTERED

## 2020-06-02 PROCEDURE — 37000009 HC ANESTHESIA EA ADD 15 MINS: Performed by: ORTHOPAEDIC SURGERY

## 2020-06-02 RX ORDER — OXYCODONE HYDROCHLORIDE 5 MG/1
5 TABLET ORAL
Status: DISCONTINUED | OUTPATIENT
Start: 2020-06-02 | End: 2020-06-02 | Stop reason: HOSPADM

## 2020-06-02 RX ORDER — FENTANYL CITRATE 50 UG/ML
INJECTION, SOLUTION INTRAMUSCULAR; INTRAVENOUS
Status: DISCONTINUED | OUTPATIENT
Start: 2020-06-02 | End: 2020-06-02

## 2020-06-02 RX ORDER — CEFAZOLIN SODIUM 1 G/3ML
INJECTION, POWDER, FOR SOLUTION INTRAMUSCULAR; INTRAVENOUS
Status: DISCONTINUED | OUTPATIENT
Start: 2020-06-02 | End: 2020-06-02

## 2020-06-02 RX ORDER — BUPIVACAINE HYDROCHLORIDE 2.5 MG/ML
INJECTION, SOLUTION EPIDURAL; INFILTRATION; INTRACAUDAL
Status: DISCONTINUED | OUTPATIENT
Start: 2020-06-02 | End: 2020-06-02 | Stop reason: HOSPADM

## 2020-06-02 RX ORDER — FENTANYL CITRATE 50 UG/ML
25 INJECTION, SOLUTION INTRAMUSCULAR; INTRAVENOUS
Status: DISCONTINUED | OUTPATIENT
Start: 2020-06-02 | End: 2020-06-02 | Stop reason: HOSPADM

## 2020-06-02 RX ORDER — MIDAZOLAM HYDROCHLORIDE 1 MG/ML
INJECTION INTRAMUSCULAR; INTRAVENOUS
Status: DISCONTINUED | OUTPATIENT
Start: 2020-06-02 | End: 2020-06-02

## 2020-06-02 RX ORDER — DIPHENHYDRAMINE HYDROCHLORIDE 50 MG/ML
12.5 INJECTION INTRAMUSCULAR; INTRAVENOUS EVERY 6 HOURS PRN
Status: DISCONTINUED | OUTPATIENT
Start: 2020-06-02 | End: 2020-06-02 | Stop reason: HOSPADM

## 2020-06-02 RX ORDER — ONDANSETRON 2 MG/ML
4 INJECTION INTRAMUSCULAR; INTRAVENOUS DAILY PRN
Status: DISCONTINUED | OUTPATIENT
Start: 2020-06-02 | End: 2020-06-02 | Stop reason: HOSPADM

## 2020-06-02 RX ORDER — HYDROCODONE BITARTRATE AND ACETAMINOPHEN 5; 325 MG/1; MG/1
1 TABLET ORAL EVERY 4 HOURS PRN
Qty: 24 TABLET | Refills: 0 | Status: SHIPPED | OUTPATIENT
Start: 2020-06-02 | End: 2020-06-06

## 2020-06-02 RX ORDER — LIDOCAINE HYDROCHLORIDE 5 MG/ML
INJECTION, SOLUTION INFILTRATION; INTRAVENOUS
Status: DISCONTINUED | OUTPATIENT
Start: 2020-06-02 | End: 2020-06-02

## 2020-06-02 RX ORDER — HYDROCODONE BITARTRATE AND ACETAMINOPHEN 5; 325 MG/1; MG/1
1 TABLET ORAL EVERY 4 HOURS PRN
Status: DISCONTINUED | OUTPATIENT
Start: 2020-06-02 | End: 2020-06-02 | Stop reason: HOSPADM

## 2020-06-02 RX ORDER — PROPOFOL 10 MG/ML
VIAL (ML) INTRAVENOUS
Status: DISCONTINUED | OUTPATIENT
Start: 2020-06-02 | End: 2020-06-02

## 2020-06-02 RX ORDER — ACETAMINOPHEN 500 MG
1000 TABLET ORAL ONCE
Status: COMPLETED | OUTPATIENT
Start: 2020-06-02 | End: 2020-06-02

## 2020-06-02 RX ORDER — SODIUM CHLORIDE 0.9 % (FLUSH) 0.9 %
10 SYRINGE (ML) INJECTION
Status: DISCONTINUED | OUTPATIENT
Start: 2020-06-02 | End: 2020-06-02 | Stop reason: HOSPADM

## 2020-06-02 RX ORDER — ONDANSETRON 2 MG/ML
INJECTION INTRAMUSCULAR; INTRAVENOUS
Status: DISCONTINUED | OUTPATIENT
Start: 2020-06-02 | End: 2020-06-02

## 2020-06-02 RX ORDER — GABAPENTIN 300 MG/1
300 CAPSULE ORAL ONCE
Status: COMPLETED | OUTPATIENT
Start: 2020-06-02 | End: 2020-06-02

## 2020-06-02 RX ORDER — SODIUM CHLORIDE, SODIUM LACTATE, POTASSIUM CHLORIDE, CALCIUM CHLORIDE 600; 310; 30; 20 MG/100ML; MG/100ML; MG/100ML; MG/100ML
INJECTION, SOLUTION INTRAVENOUS CONTINUOUS PRN
Status: DISCONTINUED | OUTPATIENT
Start: 2020-06-02 | End: 2020-06-02

## 2020-06-02 RX ADMIN — ONDANSETRON 4 MG: 2 INJECTION INTRAMUSCULAR; INTRAVENOUS at 08:06

## 2020-06-02 RX ADMIN — PROPOFOL 10 MG: 10 INJECTION, EMULSION INTRAVENOUS at 09:06

## 2020-06-02 RX ADMIN — MIDAZOLAM HYDROCHLORIDE 1 MG: 1 INJECTION, SOLUTION INTRAMUSCULAR; INTRAVENOUS at 08:06

## 2020-06-02 RX ADMIN — SODIUM CHLORIDE, SODIUM LACTATE, POTASSIUM CHLORIDE, AND CALCIUM CHLORIDE: .6; .31; .03; .02 INJECTION, SOLUTION INTRAVENOUS at 08:06

## 2020-06-02 RX ADMIN — FENTANYL CITRATE 50 MCG: 50 INJECTION INTRAMUSCULAR; INTRAVENOUS at 09:06

## 2020-06-02 RX ADMIN — ACETAMINOPHEN 1000 MG: 500 TABLET, FILM COATED ORAL at 06:06

## 2020-06-02 RX ADMIN — GABAPENTIN 300 MG: 300 CAPSULE ORAL at 06:06

## 2020-06-02 RX ADMIN — PROPOFOL 20 MG: 10 INJECTION, EMULSION INTRAVENOUS at 09:06

## 2020-06-02 RX ADMIN — MIDAZOLAM HYDROCHLORIDE 1 MG: 1 INJECTION, SOLUTION INTRAMUSCULAR; INTRAVENOUS at 09:06

## 2020-06-02 RX ADMIN — FENTANYL CITRATE 50 MCG: 50 INJECTION INTRAMUSCULAR; INTRAVENOUS at 08:06

## 2020-06-02 RX ADMIN — LIDOCAINE HYDROCHLORIDE 50 ML: 5 INJECTION, SOLUTION INFILTRATION at 09:06

## 2020-06-02 RX ADMIN — CEFAZOLIN 2 G: 330 INJECTION, POWDER, FOR SOLUTION INTRAMUSCULAR; INTRAVENOUS at 08:06

## 2020-06-02 NOTE — TRANSFER OF CARE
"Anesthesia Transfer of Care Note    Patient: Clifford Tolbert    Procedure(s) Performed: Procedure(s) (LRB):  RELEASE, TRIGGER FINGER (Left)    Patient location: Other: DSU/OR Circulator    Anesthesia Type: MAC    Post pain: adequate analgesia    Post assessment: no apparent anesthetic complications    Post vital signs: stable    Level of consciousness: awake, alert and oriented    Nausea/Vomiting: no nausea/vomiting    Complications: none    Transfer of care protocol was followed      Last vitals:   Visit Vitals  BP (!) 179/84 (BP Location: Right arm, Patient Position: Lying)   Pulse 72   Temp 36.8 °C (98.2 °F) (Oral)   Resp 12   Ht 5' 8" (1.727 m)   Wt 78.9 kg (174 lb)   SpO2 96%   BMI 26.46 kg/m²     "

## 2020-06-02 NOTE — ANESTHESIA PREPROCEDURE EVALUATION
06/02/2020  Clifford Tolbert is a 64 y.o., male.    Anesthesia Evaluation    I have reviewed the Patient Summary Reports.    I have reviewed the Nursing Notes.    I have reviewed the Medications.     Review of Systems  Anesthesia Hx:  Neg history of prior surgery. Denies Family Hx of Anesthesia complications.   Denies Personal Hx of Anesthesia complications.   Social:  Smoker, Alcohol Use    Hepatic/GI:   Liver disease: Ulcerative colitis.    Neurological:   Neuromuscular disease: Sciatic pain. Left side   Psych:   anxiety depression        Patient Active Problem List   Diagnosis    Acute pain of right wrist    Closed nondisplaced fracture of shaft of third metacarpal bone of right hand       Past Surgical History:   Procedure Laterality Date    ANKLE LIGAMENT RECONSTRUCTION Left 03/13/2008    bilateral     SHOULDER ARTHROSCOPY W/ LABRAL REPAIR Right 02/08/2017    WRIST FUSION Right 01/17/2011    1980 also right wrist fusion        Tobacco Use:  The patient  reports that he has been smoking pipe. He has never used smokeless tobacco.     Results for orders placed or performed during the hospital encounter of 05/29/20   EKG 12-lead    Collection Time: 05/29/20  1:42 PM    Narrative    Test Reason : Z01.818,    Vent. Rate : 066 BPM     Atrial Rate : 066 BPM     P-R Int : 130 ms          QRS Dur : 088 ms      QT Int : 400 ms       P-R-T Axes : 064 069 036 degrees     QTc Int : 419 ms    Normal sinus rhythm  Normal ECG  No previous ECGs available  Confirmed by Ha Savage MD ([id number]) on 6/1/2020 8:45:14 AM    Referred By: LINDSAY SWENSON           Confirmed By:Ha Savage MD             Lab Results   Component Value Date    WBC 10.28 05/29/2020    HGB 14.3 05/29/2020    HCT 44.7 05/29/2020    MCV 90 05/29/2020     05/29/2020     BMP  Lab Results   Component Value Date     05/29/2020     K 4.7 05/29/2020     05/29/2020    CO2 28 05/29/2020    BUN 21 05/29/2020    CREATININE 1.1 05/29/2020    CALCIUM 8.8 05/29/2020    ANIONGAP 7 (L) 05/29/2020    ESTGFRAFRICA >60.0 05/29/2020    EGFRNONAA >60.0 05/29/2020     Results for IVA PICKARD (MRN [id number]) as of 6/2/2020 06:18   Ref. Range 6/1/2020 09:50   SARS-CoV2 (COVID-19) Qualitative PCR Latest Ref Range: Not Detected  Not Detected         Physical Exam  General:  Well nourished    Airway/Jaw/Neck:  Airway Findings: Mouth Opening: Normal Tongue: Normal  General Airway Assessment: Adult  Mallampati: II  Improves to II with phonation.  TM Distance: Normal, at least 6 cm  Jaw/Neck Findings:  Neck ROM: Normal ROM       Chest/Lungs:  Chest/Lungs Findings: Clear to auscultation, Normal Respiratory Rate     Heart/Vascular:  Heart Findings: Rate: Normal  Rhythm: Regular Rhythm  Sounds: Normal        Mental Status:  Mental Status Findings:  Cooperative, Alert and Oriented         Anesthesia Plan  Type of Anesthesia, risks & benefits discussed:  Anesthesia Type:  regional  Patient's Preference:   Intra-op Monitoring Plan: standard ASA monitors  Intra-op Monitoring Plan Comments:   Post Op Pain Control Plan: multimodal analgesia  Post Op Pain Control Plan Comments:   Induction:   IV  Beta Blocker:  Patient is not currently on a Beta-Blocker (No further documentation required).       Informed Consent: Patient understands risks and agrees with Anesthesia plan.  Questions answered. Anesthesia consent signed with patient.  ASA Score: 2     Day of Surgery Review of History & Physical:    H&P update referred to the surgeon.     Anesthesia Plan Notes: [place] Block  Tylenol 1 gm po and Neurontin 300 mg po in ODS        Ready For Surgery From Anesthesia Perspective.

## 2020-06-02 NOTE — H&P
Review of Systems  Anesthesia Hx:  Neg history of prior surgery. Denies Family Hx of Anesthesia complications.   Denies Personal Hx of Anesthesia complications.   Social:  Smoker, Alcohol Use    Hepatic/GI:   Liver disease: Ulcerative colitis.    Neurological:   Neuromuscular disease: Sciatic pain. Left side   Psych:   anxiety depression              Patient Active Problem List   Diagnosis    Acute pain of right wrist    Closed nondisplaced fracture of shaft of third metacarpal bone of right hand               Past Surgical History:   Procedure Laterality Date    ANKLE LIGAMENT RECONSTRUCTION Left 03/13/2008     bilateral     SHOULDER ARTHROSCOPY W/ LABRAL REPAIR Right 02/08/2017    WRIST FUSION Right 01/17/2011     1980 also right wrist fusion         Tobacco Use:  The patient  reports that he has been smoking pipe. He has never used smokeless tobacco.          Results for orders placed or performed during the hospital encounter of 05/29/20   EKG 12-lead     Collection Time: 05/29/20  1:42 PM     Narrative     Test Reason : Z01.818,     Vent. Rate : 066 BPM     Atrial Rate : 066 BPM     P-R Int : 130 ms          QRS Dur : 088 ms      QT Int : 400 ms       P-R-T Axes : 064 069 036 degrees     QTc Int : 419 ms     Normal sinus rhythm  Normal ECG  No previous ECGs available  Confirmed by Ha Savage MD ([id number]) on 6/1/2020 8:45:14 AM     Referred By: LINDSAY SWENSON           Confirmed By:Ha Savage MD                     Lab Results   Component Value Date     WBC 10.28 05/29/2020     HGB 14.3 05/29/2020     HCT 44.7 05/29/2020     MCV 90 05/29/2020      05/29/2020      BMP        Lab Results   Component Value Date      05/29/2020     K 4.7 05/29/2020      05/29/2020     CO2 28 05/29/2020     BUN 21 05/29/2020     CREATININE 1.1 05/29/2020     CALCIUM 8.8 05/29/2020     ANIONGAP 7 (L) 05/29/2020     ESTGFRAFRICA >60.0 05/29/2020     EGFRNONAA >60.0 05/29/2020      Results for  IVA PICKARD (MRN [id number]) as of 6/2/2020 06:18    Ref. Range 6/1/2020 09:50   SARS-CoV2 (COVID-19) Qualitative PCR Latest Ref Range: Not Detected  Not Detected            Physical Exam  General:  Well nourished    Airway/Jaw/Neck:  Airway Findings: Mouth Opening: Normal Tongue: Normal  General Airway Assessment: Adult  Mallampati: II  Improves to II with phonation.  TM Distance: Normal, at least 6 cm  Jaw/Neck Findings:  Neck ROM: Normal ROM       Chest/Lungs:  Chest/Lungs Findings: Clear to auscultation, Normal Respiratory Rate     Heart/Vascular:  Heart Findings: Rate: Normal  Rhythm: Regular Rhythm  Sounds: Normal        Mental Status:  Mental Status Findings:  Cooperative, Alert and Oriented       A/Plan: Trigger finger release Left long finger

## 2020-06-02 NOTE — ANESTHESIA PROCEDURE NOTES
Peripheral Block    Patient location during procedure: OR    Reason for block: primary anesthetic   Diagnosis: Trigger finger, left   Start time: 6/2/2020 9:12 AM  Timeout: 6/2/2020 9:00 AM   End time: 6/2/2020 9:14 AM    Staffing  Authorizing Provider: Emilia Roman MD  Performing Provider: Emilia Roman MD    Preanesthetic Checklist  Completed: patient identified, site marked, surgical consent, pre-op evaluation, timeout performed, IV checked, risks and benefits discussed and monitors and equipment checked  Peripheral Block  Patient position: supine  Patient monitoring: heart rate, continuous pulse ox, continuous capnometry and frequent blood pressure checks  Block type: [place] block  Laterality: left  Injection technique: single shot  Needle  Needle gauge: 22 G  Needle localization: anatomical landmarks     Assessment  Heart rate change: no  Slow fractionated injection: yes  Additional Notes  Patient identified, timeout performed. Monitors applied. Esmarch applied and tourniquet to left upper arm at 250mmHg. 0.5% lidocaine, 50ml given via left hand 22G PIV. VSS throughout. Patient tolerated procedure well. CRNA present and monitoring vital signs throughout.

## 2020-06-02 NOTE — OP NOTE
Preoperative diagnosis:  Acquired trigger finger left long finger.  Postoperative diagnosis:  Same  Brief history:  1 year history of intermittent triggering in the left long finger.    Procedure in detail:  Patient brought in the operating room placed supine position on the operating table with a hand table attached the left side of the table.  Regional block anesthesia was administered by the anesthesiologist.  Sedation was also used.  The left upper extremity was prepped from fingertips to tourniquet with Hibiclens, alcohol, ChloraPrep and draped in a sterile orthopedic fashion.  A 2 cm oblique incision was made over the volar aspect of the 3rd MCP joint centered over the A1 tendon sheath.  Bipolar cautery was used to control bleeding.  Blunt dissection exposed the A1 tendon sheath which was completely released under direct visualization.  The swelling in the tendon and small longitudinal tear was identified.  The tendon glided smoothly without entering the A2 tendon sheath.  The wound was soaked in Betadine solution for 3 min irrigated with normal saline.  The incision was closed with the 3 -0  plain subcuticular suture and sealed with Dermabond.  A Mepilex Silver dressing applied.  The patient tolerated procedure well.  Blood loss 1 cc there were no complications.

## 2020-06-02 NOTE — ANESTHESIA POSTPROCEDURE EVALUATION
Anesthesia Post Evaluation    Patient: Clifford Tolbert    Procedure(s) Performed: Procedure(s) (LRB):  RELEASE, TRIGGER FINGER (Left)    Final Anesthesia Type: regional    Patient location: holding.  Patient participation: Yes- Able to Participate  Level of consciousness: awake and alert  Post-procedure vital signs: reviewed and stable  Pain management: adequate  Airway patency: patent    PONV status at discharge: No PONV  Anesthetic complications: no      Cardiovascular status: blood pressure returned to baseline and hemodynamically stable  Respiratory status: unassisted, spontaneous ventilation and room air  Hydration status: euvolemic  Follow-up not needed.          Vitals Value Taken Time   /84 6/2/2020  6:07 AM   Temp 36.8 °C (98.2 °F) 6/2/2020  6:07 AM   Pulse 72 6/2/2020  6:07 AM   Resp 12 6/2/2020  6:07 AM   SpO2 96 % 6/2/2020  6:07 AM         No case tracking events are documented in the log.      Pain/Eric Score: Pain Rating Prior to Med Admin: 2 (6/2/2020  6:36 AM)

## 2020-11-03 ENCOUNTER — LAB VISIT (OUTPATIENT)
Dept: LAB | Facility: HOSPITAL | Age: 65
End: 2020-11-03
Attending: INTERNAL MEDICINE
Payer: COMMERCIAL

## 2020-11-03 DIAGNOSIS — I10 ESSENTIAL HYPERTENSION, MALIGNANT: Primary | ICD-10-CM

## 2020-11-03 LAB
ALBUMIN SERPL BCP-MCNC: 3.6 G/DL (ref 3.5–5.2)
ALP SERPL-CCNC: 50 U/L (ref 55–135)
ALT SERPL W/O P-5'-P-CCNC: 21 U/L (ref 10–44)
ANION GAP SERPL CALC-SCNC: 10 MMOL/L (ref 8–16)
AST SERPL-CCNC: 25 U/L (ref 10–40)
BILIRUB SERPL-MCNC: 0.8 MG/DL (ref 0.1–1)
BUN SERPL-MCNC: 20 MG/DL (ref 8–23)
CALCIUM SERPL-MCNC: 9.1 MG/DL (ref 8.7–10.5)
CHLORIDE SERPL-SCNC: 103 MMOL/L (ref 95–110)
CO2 SERPL-SCNC: 27 MMOL/L (ref 23–29)
CREAT SERPL-MCNC: 1 MG/DL (ref 0.5–1.4)
EST. GFR  (AFRICAN AMERICAN): >60 ML/MIN/1.73 M^2
EST. GFR  (NON AFRICAN AMERICAN): >60 ML/MIN/1.73 M^2
GLUCOSE SERPL-MCNC: 99 MG/DL (ref 70–110)
POTASSIUM SERPL-SCNC: 4.9 MMOL/L (ref 3.5–5.1)
PROT SERPL-MCNC: 6.8 G/DL (ref 6–8.4)
SODIUM SERPL-SCNC: 140 MMOL/L (ref 136–145)

## 2020-11-03 PROCEDURE — 36415 COLL VENOUS BLD VENIPUNCTURE: CPT

## 2020-11-03 PROCEDURE — 80053 COMPREHEN METABOLIC PANEL: CPT

## 2020-11-05 ENCOUNTER — OFFICE VISIT (OUTPATIENT)
Dept: CARDIOLOGY | Facility: CLINIC | Age: 65
End: 2020-11-05
Payer: COMMERCIAL

## 2020-11-05 VITALS
RESPIRATION RATE: 16 BRPM | BODY MASS INDEX: 26.83 KG/M2 | HEIGHT: 68 IN | WEIGHT: 177 LBS | OXYGEN SATURATION: 98 % | HEART RATE: 77 BPM | SYSTOLIC BLOOD PRESSURE: 142 MMHG | DIASTOLIC BLOOD PRESSURE: 78 MMHG

## 2020-11-05 DIAGNOSIS — I10 ESSENTIAL HYPERTENSION: Primary | ICD-10-CM

## 2020-11-05 DIAGNOSIS — M19.90 ARTHRITIS: ICD-10-CM

## 2020-11-05 PROCEDURE — 3008F BODY MASS INDEX DOCD: CPT | Mod: CPTII,S$GLB,, | Performed by: INTERNAL MEDICINE

## 2020-11-05 PROCEDURE — 3008F PR BODY MASS INDEX (BMI) DOCUMENTED: ICD-10-PCS | Mod: CPTII,S$GLB,, | Performed by: INTERNAL MEDICINE

## 2020-11-05 PROCEDURE — 99214 PR OFFICE/OUTPT VISIT, EST, LEVL IV, 30-39 MIN: ICD-10-PCS | Mod: S$GLB,,, | Performed by: INTERNAL MEDICINE

## 2020-11-05 PROCEDURE — 99214 OFFICE O/P EST MOD 30 MIN: CPT | Mod: S$GLB,,, | Performed by: INTERNAL MEDICINE

## 2020-11-05 RX ORDER — LOSARTAN POTASSIUM 50 MG/1
50 TABLET ORAL DAILY
Qty: 90 TABLET | Refills: 3 | Status: SHIPPED | OUTPATIENT
Start: 2020-11-05 | End: 2021-05-06 | Stop reason: DRUGHIGH

## 2020-11-05 RX ORDER — LOSARTAN POTASSIUM 25 MG/1
TABLET ORAL
COMMUNITY
Start: 2020-10-07 | End: 2020-11-05

## 2020-11-05 NOTE — PROGRESS NOTES
Subjective:    Patient ID:  Clifford Tolbert is a 64 y.o. male patient here for evaluation Hypertension (current bp log) and Dizziness      History of Present Illness:     Mr. Tolbert is here today for his follow up visit. He denies any chest pain or SOB. His blood pressures have been stable most of the time. He has some blood pressures that are in the 150 ranges.  Our goal is to have his blood pressure is less than 130 at all times.         Review of patient's allergies indicates:   Allergen Reactions    Tetracyclines Hives       Past Medical History:   Diagnosis Date    Depression with anxiety     no longer needs treatment    Neuritis     Peroneal tendinitis     Plantar fasciitis, left     Sciatic nerve pain     Stiffness of right shoulder joint     Trigger finger of left hand 2018    Ulcerative colitis      Past Surgical History:   Procedure Laterality Date    ANKLE LIGAMENT RECONSTRUCTION Left 03/13/2008    bilateral     SHOULDER ARTHROSCOPY W/ LABRAL REPAIR Right 02/08/2017    TRIGGER FINGER RELEASE Left 6/2/2020    Procedure: RELEASE, TRIGGER FINGER;  Surgeon: Demetrius Baez Jr., MD;  Location: [facility];  Service: Orthopedics;  Laterality: Left;    WRIST FUSION Right 01/17/2011    1980 also right wrist fusion     Social History     Tobacco Use    Smoking status: Light Tobacco Smoker     Types: Pipe    Smokeless tobacco: Never Used   Substance Use Topics    Alcohol use: Yes     Comment: rarely    Drug use: No        Review of Systems:    As noted in HPI in addition      REVIEW OF SYSTEMS  CARDIOVASCULAR: No recent chest pain, palpitations, arm, neck, or jaw pain  RESPIRATORY: No recent fever, cough chills, SOB or congestion  : No blood in the urine  GI: No Nausea, vomiting, constipation, diarrhea, blood, or reflux.  MUSCULOSKELETAL: No myalgias  NEURO: No lightheadedness or dizziness  EYES: No Double vision, blurry, vision or headache              Objective:        Vitals:    11/05/20 1437    BP: (!) 142/78   Pulse: 77   Resp: 16       LIPIDS - LAST 2   No results found for: CHOL, HDL, LDLCALC, TRIG, CHOLHDL    CBC - LAST 2  Lab Results   Component Value Date    WBC 10.28 05/29/2020    RBC 4.97 05/29/2020    HGB 14.3 05/29/2020    HCT 44.7 05/29/2020    MCV 90 05/29/2020    MCH 28.8 05/29/2020    MCHC 32.0 05/29/2020    RDW 13.7 05/29/2020     05/29/2020    MPV 10.6 05/29/2020    GRAN 5.3 05/29/2020    GRAN 51.7 05/29/2020    LYMPH 3.9 05/29/2020    LYMPH 37.8 05/29/2020    MONO 0.8 05/29/2020    MONO 7.4 05/29/2020    BASO 0.04 05/29/2020    NRBC 0 05/29/2020       CHEMISTRY & LIVER FUNCTION - LAST 2  Lab Results   Component Value Date     11/03/2020     05/29/2020    K 4.9 11/03/2020    K 4.7 05/29/2020     11/03/2020     05/29/2020    CO2 27 11/03/2020    CO2 28 05/29/2020    ANIONGAP 10 11/03/2020    ANIONGAP 7 (L) 05/29/2020    BUN 20 11/03/2020    BUN 21 05/29/2020    CREATININE 1.0 11/03/2020    CREATININE 1.1 05/29/2020    GLU 99 11/03/2020     (H) 05/29/2020    CALCIUM 9.1 11/03/2020    CALCIUM 8.8 05/29/2020    ALBUMIN 3.6 11/03/2020    ALBUMIN 3.7 05/29/2020    PROT 6.8 11/03/2020    PROT 6.8 05/29/2020    ALKPHOS 50 (L) 11/03/2020    ALKPHOS 51 (L) 05/29/2020    ALT 21 11/03/2020    ALT 22 05/29/2020    AST 25 11/03/2020    AST 23 05/29/2020    BILITOT 0.8 11/03/2020    BILITOT 0.7 05/29/2020        CARDIAC PROFILE - LAST 2  No results found for: BNP, CPK, CPKMB, LDH, TROPONINI     COAGULATION - LAST 2  No results found for: LABPT, INR, APTT    ENDOCRINE & PSA - LAST 2  No results found for: HGBA1C, MICROALBUR, TSH, PROCAL, PSA     ECHOCARDIOGRAM RESULTS  No results found for this or any previous visit.    CURRENT/PREVIOUS VISIT EKG  Results for orders placed or performed during the hospital encounter of 05/29/20   EKG 12-lead    Collection Time: 05/29/20  1:42 PM    Narrative    Test Reason : Z01.818,    Vent. Rate : 066 BPM     Atrial Rate : 066 BPM      P-R Int : 130 ms          QRS Dur : 088 ms      QT Int : 400 ms       P-R-T Axes : 064 069 036 degrees     QTc Int : 419 ms    Normal sinus rhythm  Normal ECG  No previous ECGs available  Confirmed by Ha Savage MD ([id number]) on 6/1/2020 8:45:14 AM    Referred By: LINDSAY SWENSON           Confirmed By:Ha Savage MD         PHYSICAL EXAM  CONSTITUTIONAL: Well built, well nourished in no apparent distress  NECK: no carotid bruit, no JVD  LUNGS: CTA  CHEST WALL: no tenderness  HEART: regular rate and rhythm, S1, S2 normal, no murmur, click, rub or gallop   ABDOMEN: soft, non-tender; bowel sounds normal; no masses,  no organomegaly  EXTREMITIES: Extremities normal, no edema, no calf tenderness noted--fused wrist on the right side  NEURO: AAO X 3    I HAVE REVIEWED :    The vital signs, nurses notes, and all the pertinent radiology and labs.         Current Outpatient Medications   Medication Instructions    gabapentin (NEURONTIN) 300 mg, Oral, 3 times daily    ibuprofen (ADVIL) 200 mg, Oral, Every 6 hours PRN    losartan (COZAAR) 25 MG tablet No dose, route, or frequency recorded.    mesalamine (CANASA) 500 mg, Rectal, Daily PRN    mesalamine (LIALDA) 1.2 g, Oral, Daily PRN    naproxen sodium (ANAPROX) 220 mg, Oral, Every 12 hours (non-standard times)          Assessment:       1. HTN- Suboptimally controlled  2. Arthritis      Plan:       Increase Losartan 50 mg daily      No follow-ups on file.

## 2021-04-14 ENCOUNTER — HOSPITAL ENCOUNTER (OUTPATIENT)
Dept: RADIOLOGY | Facility: HOSPITAL | Age: 66
Discharge: HOME | End: 2021-04-14
Attending: ORTHOPAEDIC SURGERY
Payer: MEDICARE

## 2021-04-14 ENCOUNTER — OFFICE VISIT (OUTPATIENT)
Dept: ORTHOPEDICS | Facility: CLINIC | Age: 66
End: 2021-04-14
Payer: MEDICARE

## 2021-04-14 VITALS — HEIGHT: 68 IN | WEIGHT: 177 LBS | RESPIRATION RATE: 16 BRPM | BODY MASS INDEX: 26.83 KG/M2

## 2021-04-14 DIAGNOSIS — M65.342 TRIGGER RING FINGER OF LEFT HAND: ICD-10-CM

## 2021-04-14 DIAGNOSIS — M79.642 PAIN OF LEFT HAND: Primary | ICD-10-CM

## 2021-04-14 DIAGNOSIS — M65.312 TRIGGER FINGER OF LEFT THUMB: Primary | ICD-10-CM

## 2021-04-14 DIAGNOSIS — M79.642 PAIN OF LEFT HAND: ICD-10-CM

## 2021-04-14 PROCEDURE — 73130 XR HAND COMPLETE 3 VIEW LEFT: ICD-10-PCS | Mod: 26,LT,, | Performed by: RADIOLOGY

## 2021-04-14 PROCEDURE — 73130 X-RAY EXAM OF HAND: CPT | Mod: TC,PN,LT

## 2021-04-14 PROCEDURE — 99203 PR OFFICE/OUTPT VISIT, NEW, LEVL III, 30-44 MIN: ICD-10-PCS | Mod: 25,S$GLB,, | Performed by: ORTHOPAEDIC SURGERY

## 2021-04-14 PROCEDURE — 20550 NJX 1 TENDON SHEATH/LIGAMENT: CPT | Mod: LT,S$GLB,, | Performed by: ORTHOPAEDIC SURGERY

## 2021-04-14 PROCEDURE — 20550 TENDON SHEATH: ICD-10-PCS | Mod: LT,S$GLB,, | Performed by: ORTHOPAEDIC SURGERY

## 2021-04-14 PROCEDURE — 73130 X-RAY EXAM OF HAND: CPT | Mod: 26,LT,, | Performed by: RADIOLOGY

## 2021-04-14 PROCEDURE — 99999 PR PBB SHADOW E&M-EST. PATIENT-LVL III: ICD-10-PCS | Mod: PBBFAC,,, | Performed by: ORTHOPAEDIC SURGERY

## 2021-04-14 PROCEDURE — 99203 OFFICE O/P NEW LOW 30 MIN: CPT | Mod: 25,S$GLB,, | Performed by: ORTHOPAEDIC SURGERY

## 2021-04-14 PROCEDURE — 99999 PR PBB SHADOW E&M-EST. PATIENT-LVL III: CPT | Mod: PBBFAC,,, | Performed by: ORTHOPAEDIC SURGERY

## 2021-04-14 RX ORDER — TRIAMCINOLONE ACETONIDE 40 MG/ML
40 INJECTION, SUSPENSION INTRA-ARTICULAR; INTRAMUSCULAR
Status: DISCONTINUED | OUTPATIENT
Start: 2021-04-14 | End: 2021-04-14 | Stop reason: HOSPADM

## 2021-04-14 RX ORDER — TRIAMCINOLONE ACETONIDE 40 MG/ML
20 INJECTION, SUSPENSION INTRA-ARTICULAR; INTRAMUSCULAR
Status: DISCONTINUED | OUTPATIENT
Start: 2021-04-14 | End: 2021-04-14 | Stop reason: HOSPADM

## 2021-04-14 RX ADMIN — TRIAMCINOLONE ACETONIDE 40 MG: 40 INJECTION, SUSPENSION INTRA-ARTICULAR; INTRAMUSCULAR at 01:04

## 2021-04-14 RX ADMIN — TRIAMCINOLONE ACETONIDE 20 MG: 40 INJECTION, SUSPENSION INTRA-ARTICULAR; INTRAMUSCULAR at 01:04

## 2021-05-06 ENCOUNTER — OFFICE VISIT (OUTPATIENT)
Dept: CARDIOLOGY | Facility: CLINIC | Age: 66
End: 2021-05-06
Payer: MEDICARE

## 2021-05-06 VITALS
HEIGHT: 68 IN | DIASTOLIC BLOOD PRESSURE: 84 MMHG | RESPIRATION RATE: 16 BRPM | BODY MASS INDEX: 26.52 KG/M2 | HEART RATE: 77 BPM | OXYGEN SATURATION: 99 % | SYSTOLIC BLOOD PRESSURE: 136 MMHG | WEIGHT: 175 LBS

## 2021-05-06 DIAGNOSIS — I10 ESSENTIAL HYPERTENSION: Primary | ICD-10-CM

## 2021-05-06 PROCEDURE — 99213 OFFICE O/P EST LOW 20 MIN: CPT | Mod: S$GLB,,, | Performed by: INTERNAL MEDICINE

## 2021-05-06 PROCEDURE — 99213 PR OFFICE/OUTPT VISIT, EST, LEVL III, 20-29 MIN: ICD-10-PCS | Mod: S$GLB,,, | Performed by: INTERNAL MEDICINE

## 2021-05-06 RX ORDER — ACETAMINOPHEN 500 MG
500 TABLET ORAL EVERY 6 HOURS PRN
COMMUNITY

## 2021-05-06 RX ORDER — LOSARTAN POTASSIUM 25 MG/1
25 TABLET ORAL DAILY
COMMUNITY
End: 2021-05-06 | Stop reason: SDUPTHER

## 2021-05-12 ENCOUNTER — OFFICE VISIT (OUTPATIENT)
Dept: ORTHOPEDICS | Facility: CLINIC | Age: 66
End: 2021-05-12
Payer: MEDICARE

## 2021-05-12 VITALS — HEIGHT: 68 IN | WEIGHT: 175 LBS | RESPIRATION RATE: 18 BRPM | BODY MASS INDEX: 26.52 KG/M2

## 2021-05-12 DIAGNOSIS — M65.312 TRIGGER FINGER OF LEFT THUMB: Primary | ICD-10-CM

## 2021-05-12 DIAGNOSIS — M65.342 TRIGGER RING FINGER OF LEFT HAND: ICD-10-CM

## 2021-05-12 PROCEDURE — 99212 PR OFFICE/OUTPT VISIT, EST, LEVL II, 10-19 MIN: ICD-10-PCS | Mod: S$GLB,,, | Performed by: ORTHOPAEDIC SURGERY

## 2021-05-12 PROCEDURE — 99999 PR PBB SHADOW E&M-EST. PATIENT-LVL II: CPT | Mod: PBBFAC,,, | Performed by: ORTHOPAEDIC SURGERY

## 2021-05-12 PROCEDURE — 99212 OFFICE O/P EST SF 10 MIN: CPT | Mod: S$GLB,,, | Performed by: ORTHOPAEDIC SURGERY

## 2021-05-12 PROCEDURE — 99999 PR PBB SHADOW E&M-EST. PATIENT-LVL II: ICD-10-PCS | Mod: PBBFAC,,, | Performed by: ORTHOPAEDIC SURGERY

## 2022-05-02 DIAGNOSIS — G89.29 CHRONIC PAIN OF LEFT KNEE: Primary | ICD-10-CM

## 2022-05-02 DIAGNOSIS — M25.562 CHRONIC PAIN OF LEFT KNEE: Primary | ICD-10-CM

## 2022-05-03 ENCOUNTER — HOSPITAL ENCOUNTER (OUTPATIENT)
Dept: RADIOLOGY | Facility: HOSPITAL | Age: 67
Discharge: HOME | End: 2022-05-03
Attending: ORTHOPAEDIC SURGERY
Payer: MEDICARE

## 2022-05-03 ENCOUNTER — OFFICE VISIT (OUTPATIENT)
Dept: ORTHOPEDICS | Facility: CLINIC | Age: 67
End: 2022-05-03
Payer: MEDICARE

## 2022-05-03 VITALS — HEIGHT: 68 IN | BODY MASS INDEX: 26.52 KG/M2 | WEIGHT: 175 LBS

## 2022-05-03 DIAGNOSIS — G89.29 CHRONIC PAIN OF LEFT KNEE: ICD-10-CM

## 2022-05-03 DIAGNOSIS — M17.12 PRIMARY OSTEOARTHRITIS OF LEFT KNEE: Primary | ICD-10-CM

## 2022-05-03 DIAGNOSIS — M25.562 CHRONIC PAIN OF LEFT KNEE: ICD-10-CM

## 2022-05-03 PROCEDURE — 3008F PR BODY MASS INDEX (BMI) DOCUMENTED: ICD-10-PCS | Mod: CPTII,S$GLB,, | Performed by: ORTHOPAEDIC SURGERY

## 2022-05-03 PROCEDURE — 20610 DRAIN/INJ JOINT/BURSA W/O US: CPT | Mod: LT,S$GLB,, | Performed by: ORTHOPAEDIC SURGERY

## 2022-05-03 PROCEDURE — 4010F PR ACE/ARB THEARPY RXD/TAKEN: ICD-10-PCS | Mod: CPTII,S$GLB,, | Performed by: ORTHOPAEDIC SURGERY

## 2022-05-03 PROCEDURE — 73562 X-RAY EXAM OF KNEE 3: CPT | Mod: TC,PN,LT

## 2022-05-03 PROCEDURE — 99213 PR OFFICE/OUTPT VISIT, EST, LEVL III, 20-29 MIN: ICD-10-PCS | Mod: 25,S$GLB,, | Performed by: ORTHOPAEDIC SURGERY

## 2022-05-03 PROCEDURE — 3288F FALL RISK ASSESSMENT DOCD: CPT | Mod: CPTII,S$GLB,, | Performed by: ORTHOPAEDIC SURGERY

## 2022-05-03 PROCEDURE — 3288F PR FALLS RISK ASSESSMENT DOCUMENTED: ICD-10-PCS | Mod: CPTII,S$GLB,, | Performed by: ORTHOPAEDIC SURGERY

## 2022-05-03 PROCEDURE — 1101F PT FALLS ASSESS-DOCD LE1/YR: CPT | Mod: CPTII,S$GLB,, | Performed by: ORTHOPAEDIC SURGERY

## 2022-05-03 PROCEDURE — 73560 XR KNEE ORTHO LEFT: ICD-10-PCS | Mod: 26,RT,, | Performed by: RADIOLOGY

## 2022-05-03 PROCEDURE — 73560 X-RAY EXAM OF KNEE 1 OR 2: CPT | Mod: 59,TC,PN,RT

## 2022-05-03 PROCEDURE — 99999 PR PBB SHADOW E&M-EST. PATIENT-LVL III: ICD-10-PCS | Mod: PBBFAC,,, | Performed by: ORTHOPAEDIC SURGERY

## 2022-05-03 PROCEDURE — 99999 PR PBB SHADOW E&M-EST. PATIENT-LVL III: CPT | Mod: PBBFAC,,, | Performed by: ORTHOPAEDIC SURGERY

## 2022-05-03 PROCEDURE — 3008F BODY MASS INDEX DOCD: CPT | Mod: CPTII,S$GLB,, | Performed by: ORTHOPAEDIC SURGERY

## 2022-05-03 PROCEDURE — 99213 OFFICE O/P EST LOW 20 MIN: CPT | Mod: 25,S$GLB,, | Performed by: ORTHOPAEDIC SURGERY

## 2022-05-03 PROCEDURE — 1101F PR PT FALLS ASSESS DOC 0-1 FALLS W/OUT INJ PAST YR: ICD-10-PCS | Mod: CPTII,S$GLB,, | Performed by: ORTHOPAEDIC SURGERY

## 2022-05-03 PROCEDURE — 73562 XR KNEE ORTHO LEFT: ICD-10-PCS | Mod: 26,LT,, | Performed by: RADIOLOGY

## 2022-05-03 PROCEDURE — 4010F ACE/ARB THERAPY RXD/TAKEN: CPT | Mod: CPTII,S$GLB,, | Performed by: ORTHOPAEDIC SURGERY

## 2022-05-03 PROCEDURE — 20610 LARGE JOINT ASPIRATION/INJECTION: L KNEE: ICD-10-PCS | Mod: LT,S$GLB,, | Performed by: ORTHOPAEDIC SURGERY

## 2022-05-03 PROCEDURE — 1159F MED LIST DOCD IN RCRD: CPT | Mod: CPTII,S$GLB,, | Performed by: ORTHOPAEDIC SURGERY

## 2022-05-03 PROCEDURE — 73562 X-RAY EXAM OF KNEE 3: CPT | Mod: 26,LT,, | Performed by: RADIOLOGY

## 2022-05-03 PROCEDURE — 1159F PR MEDICATION LIST DOCUMENTED IN MEDICAL RECORD: ICD-10-PCS | Mod: CPTII,S$GLB,, | Performed by: ORTHOPAEDIC SURGERY

## 2022-05-03 PROCEDURE — 73560 X-RAY EXAM OF KNEE 1 OR 2: CPT | Mod: 26,RT,, | Performed by: RADIOLOGY

## 2022-05-03 RX ORDER — PROMETHAZINE HYDROCHLORIDE AND DEXTROMETHORPHAN HYDROBROMIDE 6.25; 15 MG/5ML; MG/5ML
5 SYRUP ORAL
COMMUNITY
Start: 2021-11-18 | End: 2024-02-07

## 2022-05-03 RX ORDER — AZITHROMYCIN 250 MG/1
TABLET, FILM COATED ORAL
COMMUNITY
Start: 2021-11-18

## 2022-05-03 RX ORDER — HYDROCODONE BITARTRATE AND ACETAMINOPHEN 5; 325 MG/1; MG/1
TABLET ORAL
COMMUNITY
End: 2024-02-07

## 2022-05-03 RX ORDER — TRAMADOL HYDROCHLORIDE 50 MG/1
50 TABLET ORAL EVERY 6 HOURS PRN
COMMUNITY
Start: 2022-03-24

## 2022-05-03 RX ORDER — TRIAMCINOLONE ACETONIDE 40 MG/ML
60 INJECTION, SUSPENSION INTRA-ARTICULAR; INTRAMUSCULAR
Status: DISCONTINUED | OUTPATIENT
Start: 2022-05-03 | End: 2022-05-03 | Stop reason: HOSPADM

## 2022-05-03 RX ORDER — AZELASTINE 1 MG/ML
1 SPRAY, METERED NASAL 2 TIMES DAILY
COMMUNITY
Start: 2021-11-18 | End: 2024-02-07

## 2022-05-03 RX ORDER — ALBUTEROL SULFATE 90 UG/1
AEROSOL, METERED RESPIRATORY (INHALATION)
COMMUNITY
Start: 2021-11-18 | End: 2024-02-07

## 2022-05-03 RX ORDER — CETIRIZINE HYDROCHLORIDE 10 MG/1
TABLET ORAL
COMMUNITY
Start: 2021-11-18 | End: 2024-02-07

## 2022-05-03 RX ORDER — AMOXICILLIN AND CLAVULANATE POTASSIUM 875; 125 MG/1; MG/1
TABLET, FILM COATED ORAL
COMMUNITY
End: 2024-02-07

## 2022-05-03 RX ADMIN — TRIAMCINOLONE ACETONIDE 60 MG: 40 INJECTION, SUSPENSION INTRA-ARTICULAR; INTRAMUSCULAR at 02:05

## 2022-05-03 NOTE — PROGRESS NOTES
5/3/2022    Past Medical History:   Diagnosis Date    Depression with anxiety     no longer needs treatment    Neuritis     Peroneal tendinitis     Plantar fasciitis, left     Sciatic nerve pain     Stiffness of right shoulder joint     Trigger finger of left hand     Ulcerative colitis        Past Surgical History:   Procedure Laterality Date    ANKLE LIGAMENT RECONSTRUCTION Left 2008    bilateral     SHOULDER ARTHROSCOPY W/ LABRAL REPAIR Right 2017    TRIGGER FINGER RELEASE Left 2020    Procedure: RELEASE, TRIGGER FINGER;  Surgeon: Demetrius Baez Jr., MD;  Location: [facility];  Service: Orthopedics;  Laterality: Left;    WRIST FUSION Right 2011    1980 also right wrist fusion       Current Outpatient Medications   Medication Sig    acetaminophen (TYLENOL) 500 MG tablet Take 500 mg by mouth every 6 (six) hours as needed for Pain. Prn    albuterol (PROVENTIL/VENTOLIN HFA) 90 mcg/actuation inhaler SMARTSI-2 Puff(s) Via Inhaler Every 4 Hours PRN    amoxicillin-clavulanate 875-125mg (AUGMENTIN) 875-125 mg per tablet amoxicillin 875 mg-potassium clavulanate 125 mg tablet    azelastine (ASTELIN) 137 mcg (0.1 %) nasal spray 1 spray 2 (two) times daily.    azithromycin (Z-NA) 250 MG tablet Take by mouth.    cetirizine (ZYRTEC) 10 MG tablet SMARTSI Tablet(s) By Mouth Every Evening    gabapentin (NEURONTIN) 300 MG capsule Take 1 capsule (300 mg total) by mouth 3 (three) times daily. (Patient taking differently: Take 300 mg by mouth 2 (two) times daily as needed. )    HYDROcodone-acetaminophen (NORCO) 5-325 mg per tablet hydrocodone 5 mg-acetaminophen 325 mg tablet    ibuprofen (ADVIL) 200 MG tablet Take 200 mg by mouth every 6 (six) hours as needed for Pain.     losartan (COZAAR) 50 MG Tab Take 1 tablet (50 mg total) by mouth once daily.    mesalamine (CANASA) 1000 MG Supp Place 500 mg rectally daily as needed.    mesalamine (LIALDA) 1.2 gram TbEC Take 1.2 g by mouth  daily as needed.    naproxen sodium (ANAPROX) 220 MG tablet Take 220 mg by mouth every 12 (twelve) hours. prn    promethazine-dextromethorphan (PROMETHAZINE-DM) 6.25-15 mg/5 mL Syrp Take 5 mLs by mouth.    traMADoL (ULTRAM) 50 mg tablet Take 50 mg by mouth every 6 (six) hours as needed.     No current facility-administered medications for this visit.       Review of patient's allergies indicates:   Allergen Reactions    Tetracyclines Hives       Family History   Problem Relation Age of Onset    Hypertension Father     Heart disease Father        Social History     Socioeconomic History    Marital status:    Tobacco Use    Smoking status: Light Tobacco Smoker     Types: Pipe    Smokeless tobacco: Never Used   Substance and Sexual Activity    Alcohol use: Yes     Comment: rarely    Drug use: No       Chief Complaint:   Chief Complaint   Patient presents with    Left Knee - Pain, Initial Assessment     Lt Knee pain after lifting heavy oakwood into the back of his truck on 03/19/22. OTC Advil offered him some relief & Tramadol from Pain Management. Increased pain in knee and unable to ambulate after golf tournament on 04/12/22. [address] 2/[address].     Knee Pain     Ice/heat application and resting his left leg offered him some relief. States dx with arthritis in his knees as a child (15-16 yrs old).          History of present illness:    This is a 66 y.o. year old male who complains of patient was lifting some oak would his truck about 6 weeks ago developed swelling and pain in his left knee also injured after playing some golf complains of pain pain level at times is 2/10    Review of Systems:    Constitution: Denies chills, fever, and sweats.  HENT: Denies headaches or blurry vision.  Cardiovascular: Denies chest pain or irregular heart beat.  Respiratory: Denies cough or shortness of breath.  Gastrointestinal: Denies abdominal pain, nausea, or vomiting.  Musculoskeletal:  Denies muscle  "cramps.  Neurological: Denies dizziness or focal weakness.  Psychiatric/Behavioral: Normal mental status.  Hematologic/Lymphatic: Denies bleeding problem or easy bruising/bleeding.  Skin: Denies rash or suspicious lesions.    Examination:    Vital Signs:    Vitals:    05/03/22 1406   Weight: 79.4 kg (175 lb)   Height: 5' 8" (1.727 m)       Body mass index is 26.61 kg/m².    This a well-developed, well nourished patient in no acute distress.    Alert and oriented x 3 and cooperative to examination.       Physical Exam:  Left knee-patient has a moderate effusion to the left knee there is no warmth the cellulitis he has good range of motion he has some medial joint line discomfort mostly    Imaging:  X-ray of the left knee shows moderate arthritic changes in all 3 compartments including the patellofemoral joint x-ray of the right knee shows moderate arthritic changes also       Assessment: Primary osteoarthritis of left knee        Plan:  I am going to go ahead and aspirate his left knee injected with Kenalog he has been instructed on straight leg raises and quadriceps exercises he is going to return in 3-4 weeks if he continues to have any problems.      DISCLAIMER: This note may have been dictated using voice recognition software and may contain grammatical errors.     NOTE: Consult report sent to referring provider via [employer] EMR.    "

## 2022-05-03 NOTE — PROCEDURES
Large Joint Aspiration/Injection: L knee    Date/Time: 5/3/2022 2:15 PM  Performed by: Lionel Martinez MD  Authorized by: Lionel Martinez MD     Indications:  Arthritis and pain  Local anesthetic:  Lidocaine 1% without epinephrine    Details:  Needle Size:  20 G  Approach:  Lateral  Location:  Knee  Site:  L knee  Medications:  60 mg triamcinolone acetonide 40 mg/mL

## 2022-06-22 ENCOUNTER — HOSPITAL ENCOUNTER (EMERGENCY)
Facility: HOSPITAL | Age: 67
Discharge: HOME | End: 2022-06-22
Attending: EMERGENCY MEDICINE
Payer: MEDICARE

## 2022-06-22 VITALS
SYSTOLIC BLOOD PRESSURE: 115 MMHG | BODY MASS INDEX: 23.8 KG/M2 | RESPIRATION RATE: 16 BRPM | DIASTOLIC BLOOD PRESSURE: 74 MMHG | TEMPERATURE: 97 F | HEIGHT: 71 IN | OXYGEN SATURATION: 95 % | WEIGHT: 170 LBS | HEART RATE: 56 BPM

## 2022-06-22 DIAGNOSIS — N13.30 HYDRONEPHROSIS, UNSPECIFIED HYDRONEPHROSIS TYPE: ICD-10-CM

## 2022-06-22 DIAGNOSIS — N20.1 URETERAL STONE: Primary | ICD-10-CM

## 2022-06-22 LAB
ALBUMIN SERPL BCP-MCNC: 4.3 G/DL (ref 3.5–5.2)
ALP SERPL-CCNC: 63 U/L (ref 55–135)
ALT SERPL W/O P-5'-P-CCNC: 17 U/L (ref 10–44)
ANION GAP SERPL CALC-SCNC: 12 MMOL/L (ref 8–16)
AST SERPL-CCNC: 18 U/L (ref 10–40)
BACTERIA #/AREA URNS HPF: ABNORMAL /HPF
BASOPHILS # BLD AUTO: 0.04 K/UL (ref 0–0.2)
BASOPHILS NFR BLD: 0.4 % (ref 0–1.9)
BILIRUB SERPL-MCNC: 0.7 MG/DL (ref 0.1–1)
BILIRUB UR QL STRIP: NEGATIVE
BUN SERPL-MCNC: 13 MG/DL (ref 8–23)
CALCIUM SERPL-MCNC: 9.4 MG/DL (ref 8.7–10.5)
CHLORIDE SERPL-SCNC: 107 MMOL/L (ref 95–110)
CLARITY UR: CLEAR
CO2 SERPL-SCNC: 24 MMOL/L (ref 23–29)
COLOR UR: YELLOW
CREAT SERPL-MCNC: 1.3 MG/DL (ref 0.5–1.4)
DIFFERENTIAL METHOD: ABNORMAL
EOSINOPHIL # BLD AUTO: 0.1 K/UL (ref 0–0.5)
EOSINOPHIL NFR BLD: 0.6 % (ref 0–8)
ERYTHROCYTE [DISTWIDTH] IN BLOOD BY AUTOMATED COUNT: 12.9 % (ref 11.5–14.5)
EST. GFR  (AFRICAN AMERICAN): >60 ML/MIN/1.73 M^2
EST. GFR  (NON AFRICAN AMERICAN): 56.9 ML/MIN/1.73 M^2
GLUCOSE SERPL-MCNC: 131 MG/DL (ref 70–110)
GLUCOSE UR QL STRIP: NEGATIVE
HCT VFR BLD AUTO: 43.9 % (ref 40–54)
HGB BLD-MCNC: 15 G/DL (ref 14–18)
HGB UR QL STRIP: ABNORMAL
HYALINE CASTS #/AREA URNS LPF: 0 /LPF
IMM GRANULOCYTES # BLD AUTO: 0.04 K/UL (ref 0–0.04)
IMM GRANULOCYTES NFR BLD AUTO: 0.4 % (ref 0–0.5)
KETONES UR QL STRIP: ABNORMAL
LEUKOCYTE ESTERASE UR QL STRIP: NEGATIVE
LIPASE SERPL-CCNC: 39 U/L (ref 4–60)
LYMPHOCYTES # BLD AUTO: 1.3 K/UL (ref 1–4.8)
LYMPHOCYTES NFR BLD: 11.9 % (ref 18–48)
MCH RBC QN AUTO: 30.2 PG (ref 27–31)
MCHC RBC AUTO-ENTMCNC: 34.2 G/DL (ref 32–36)
MCV RBC AUTO: 89 FL (ref 82–98)
MICROSCOPIC COMMENT: ABNORMAL
MONOCYTES # BLD AUTO: 0.5 K/UL (ref 0.3–1)
MONOCYTES NFR BLD: 4.9 % (ref 4–15)
NEUTROPHILS # BLD AUTO: 8.9 K/UL (ref 1.8–7.7)
NEUTROPHILS NFR BLD: 81.8 % (ref 38–73)
NITRITE UR QL STRIP: NEGATIVE
NRBC BLD-RTO: 0 /100 WBC
PH UR STRIP: 8 [PH] (ref 5–8)
PLATELET # BLD AUTO: 262 K/UL (ref 150–450)
PMV BLD AUTO: 9.9 FL (ref 9.2–12.9)
POTASSIUM SERPL-SCNC: 3.9 MMOL/L (ref 3.5–5.1)
PROT SERPL-MCNC: 7.5 G/DL (ref 6–8.4)
PROT UR QL STRIP: ABNORMAL
RBC # BLD AUTO: 4.96 M/UL (ref 4.6–6.2)
RBC #/AREA URNS HPF: 65 /HPF (ref 0–4)
SODIUM SERPL-SCNC: 143 MMOL/L (ref 136–145)
SP GR UR STRIP: 1.02 (ref 1–1.03)
SQUAMOUS #/AREA URNS HPF: 0 /HPF
URN SPEC COLLECT METH UR: ABNORMAL
UROBILINOGEN UR STRIP-ACNC: 1 EU/DL
WBC # BLD AUTO: 10.92 K/UL (ref 3.9–12.7)
WBC #/AREA URNS HPF: 0 /HPF (ref 0–5)

## 2022-06-22 PROCEDURE — 74176 CT ABD & PELVIS W/O CONTRAST: CPT | Mod: 26,,, | Performed by: RADIOLOGY

## 2022-06-22 PROCEDURE — 25000003 PHARM REV CODE 250: Performed by: EMERGENCY MEDICINE

## 2022-06-22 PROCEDURE — 74176 CT RENAL STONE STUDY ABD PELVIS WO: ICD-10-PCS | Mod: 26,,, | Performed by: RADIOLOGY

## 2022-06-22 PROCEDURE — 96374 THER/PROPH/DIAG INJ IV PUSH: CPT

## 2022-06-22 PROCEDURE — 80053 COMPREHEN METABOLIC PANEL: CPT | Performed by: EMERGENCY MEDICINE

## 2022-06-22 PROCEDURE — 85025 COMPLETE CBC W/AUTO DIFF WBC: CPT | Performed by: EMERGENCY MEDICINE

## 2022-06-22 PROCEDURE — 99285 EMERGENCY DEPT VISIT HI MDM: CPT | Mod: 25

## 2022-06-22 PROCEDURE — 63600175 PHARM REV CODE 636 W HCPCS: Performed by: EMERGENCY MEDICINE

## 2022-06-22 PROCEDURE — 83690 ASSAY OF LIPASE: CPT | Performed by: EMERGENCY MEDICINE

## 2022-06-22 PROCEDURE — 74176 CT ABD & PELVIS W/O CONTRAST: CPT | Mod: TC

## 2022-06-22 PROCEDURE — 96361 HYDRATE IV INFUSION ADD-ON: CPT

## 2022-06-22 PROCEDURE — 81000 URINALYSIS NONAUTO W/SCOPE: CPT | Performed by: EMERGENCY MEDICINE

## 2022-06-22 PROCEDURE — 96375 TX/PRO/DX INJ NEW DRUG ADDON: CPT

## 2022-06-22 RX ORDER — HYDROCODONE BITARTRATE AND ACETAMINOPHEN 7.5; 325 MG/1; MG/1
1 TABLET ORAL EVERY 6 HOURS PRN
Qty: 15 TABLET | Refills: 0 | Status: ON HOLD | OUTPATIENT
Start: 2022-06-22 | End: 2022-08-09 | Stop reason: HOSPADM

## 2022-06-22 RX ORDER — ONDANSETRON 2 MG/ML
4 INJECTION INTRAMUSCULAR; INTRAVENOUS
Status: COMPLETED | OUTPATIENT
Start: 2022-06-22 | End: 2022-06-22

## 2022-06-22 RX ORDER — IBUPROFEN 600 MG/1
600 TABLET ORAL EVERY 6 HOURS PRN
Qty: 20 TABLET | Refills: 0 | Status: ON HOLD | OUTPATIENT
Start: 2022-06-22 | End: 2022-08-09 | Stop reason: HOSPADM

## 2022-06-22 RX ORDER — HYDROMORPHONE HYDROCHLORIDE 2 MG/ML
1 INJECTION, SOLUTION INTRAMUSCULAR; INTRAVENOUS; SUBCUTANEOUS
Status: COMPLETED | OUTPATIENT
Start: 2022-06-22 | End: 2022-06-22

## 2022-06-22 RX ORDER — TAMSULOSIN HYDROCHLORIDE 0.4 MG/1
0.4 CAPSULE ORAL DAILY
Qty: 20 CAPSULE | Refills: 0 | Status: SHIPPED | OUTPATIENT
Start: 2022-06-22 | End: 2022-06-27 | Stop reason: SDUPTHER

## 2022-06-22 RX ORDER — ONDANSETRON 4 MG/1
4 TABLET, ORALLY DISINTEGRATING ORAL EVERY 8 HOURS PRN
Qty: 12 TABLET | Refills: 0 | Status: ON HOLD | OUTPATIENT
Start: 2022-06-22 | End: 2022-08-09 | Stop reason: HOSPADM

## 2022-06-22 RX ORDER — KETOROLAC TROMETHAMINE 30 MG/ML
15 INJECTION, SOLUTION INTRAMUSCULAR; INTRAVENOUS
Status: COMPLETED | OUTPATIENT
Start: 2022-06-22 | End: 2022-06-22

## 2022-06-22 RX ADMIN — SODIUM CHLORIDE 500 ML: 0.9 INJECTION, SOLUTION INTRAVENOUS at 05:06

## 2022-06-22 RX ADMIN — KETOROLAC TROMETHAMINE 15 MG: 30 INJECTION, SOLUTION INTRAMUSCULAR; INTRAVENOUS at 04:06

## 2022-06-22 RX ADMIN — ONDANSETRON 4 MG: 2 INJECTION INTRAMUSCULAR; INTRAVENOUS at 04:06

## 2022-06-22 RX ADMIN — HYDROMORPHONE HYDROCHLORIDE 1 MG: 2 INJECTION INTRAMUSCULAR; INTRAVENOUS; SUBCUTANEOUS at 04:06

## 2022-06-22 NOTE — ED PROVIDER NOTES
Encounter Date: 6/22/2022       History     Chief Complaint   Patient presents with    Flank Pain    Abdominal Pain     Rt flank pain radiating around to right side of abdomen and groin. Pt reports sudden onset today with associated n/v. Pt reports normal bm today.      66-year-old male here from home via private vehicle for evaluation and treatment of right-sided abdominal pain which began around 9:00 a.m. this morning.  Patient is also complaining of nausea and vomiting.  He had a normal bowel movement.  He has been urinating normally.  His urine has been somewhat dark.  He denies history of kidney stone.  No history of bowel obstructions or diverticulitis.  No history of gallbladder disease or pancreatitis.  He denies fever but endorses chills.  He has not tried any medications for the symptoms.  Nothing he does makes his symptoms any better or worse.  He states the pain waxes and wanes but is always present.  He describes it as a dull aching non localized pain.  Denies flank pain.           Review of patient's allergies indicates:  No Known Allergies  History reviewed. No pertinent past medical history.  History reviewed. No pertinent surgical history.  History reviewed. No pertinent family history.  Social History     Tobacco Use    Smoking status: Never Smoker    Smokeless tobacco: Never Used   Substance Use Topics    Alcohol use: Yes     Comment: socially    Drug use: Yes     Types: Marijuana     Comment: occ     Review of Systems   Constitutional: Negative.    HENT: Negative.    Eyes: Negative.    Respiratory: Negative.    Cardiovascular: Negative.    Gastrointestinal: Positive for abdominal pain, nausea and vomiting. Negative for constipation and diarrhea.   Endocrine: Negative for polydipsia and polyuria.   Genitourinary: Negative for decreased urine volume, difficulty urinating, dysuria, flank pain and hematuria.   Musculoskeletal: Negative for back pain, myalgias, neck pain and neck stiffness.    Skin: Negative for pallor and rash.   Neurological: Negative for dizziness, syncope, weakness, light-headedness and headaches.   Psychiatric/Behavioral: The patient is not nervous/anxious.        Physical Exam     Initial Vitals [06/22/22 1606]   BP Pulse Resp Temp SpO2   (!) 149/72 (!) 50 (!) 28 96.8 °F (36 °C) 99 %      MAP       --         Physical Exam    Nursing note and vitals reviewed.  Constitutional: He appears well-developed and well-nourished. He appears distressed (Secondary to pain).   HENT:   Head: Normocephalic and atraumatic.   Nose: Nose normal.   Mouth/Throat: Oropharynx is clear and moist. No oropharyngeal exudate.   Eyes: Conjunctivae and EOM are normal. Pupils are equal, round, and reactive to light. No scleral icterus.   Neck: Neck supple. No JVD present.   Normal range of motion.  Cardiovascular: Normal rate, regular rhythm, normal heart sounds and intact distal pulses. Exam reveals no gallop.    No murmur heard.  Pulmonary/Chest: No stridor. No respiratory distress. He has no wheezes. He has no rhonchi. He has no rales.   Abdominal: Abdomen is soft. Bowel sounds are normal. He exhibits no distension. There is abdominal tenderness.   Patient has right right-sided abdominal pain, mostly below the umbilicus, but sometimes the pain extends into the right upper quadrant. There is no rebound and no guarding.   Musculoskeletal:         General: No tenderness or edema. Normal range of motion.      Cervical back: Normal range of motion and neck supple.     Neurological: He is alert and oriented to person, place, and time. He has normal strength and normal reflexes. No cranial nerve deficit or sensory deficit. GCS score is 15. GCS eye subscore is 4. GCS verbal subscore is 5. GCS motor subscore is 6.   Skin: Skin is warm and dry. Capillary refill takes less than 2 seconds. No rash and no abscess noted. No erythema. No pallor.   Psychiatric: He has a normal mood and affect. His behavior is normal.          ED Course   Procedures  Labs Reviewed   URINALYSIS, REFLEX TO URINE CULTURE - Abnormal; Notable for the following components:       Result Value    Protein, UA 1+ (*)     Ketones, UA 2+ (*)     Occult Blood UA 3+ (*)     All other components within normal limits    Narrative:     Preferred Collection Type->Urine, Clean Catch  Specimen Source->Urine   CBC W/ AUTO DIFFERENTIAL - Abnormal; Notable for the following components:    Gran # (ANC) 8.9 (*)     Gran % 81.8 (*)     Lymph % 11.9 (*)     All other components within normal limits   COMPREHENSIVE METABOLIC PANEL - Abnormal; Notable for the following components:    Glucose 131 (*)     eGFR if non  56.9 (*)     All other components within normal limits   URINALYSIS MICROSCOPIC - Abnormal; Notable for the following components:    RBC, UA 65 (*)     All other components within normal limits    Narrative:     Preferred Collection Type->Urine, Clean Catch  Specimen Source->Urine   LIPASE          Imaging Results          CT Renal Stone Study ABD Pelvis WO (Final result)  Result time 06/22/22 18:51:16    Final result by Jacob Coles MD (06/22/22 18:51:16)                 Impression:      Multiple right distal ureterolithiasis resulting in mild hydronephrosis.    Bilateral nephrolithiasis.    Old granulomatous disease.      Electronically signed by: Jacob Coles MD  Date:    06/22/2022  Time:    18:51             Narrative:    EXAMINATION:  CT RENAL STONE STUDY ABD PELVIS WO    CLINICAL HISTORY:  Flank pain, kidney stone suspected;    TECHNIQUE:  Low dose axial images, sagittal and coronal reformations were obtained from the lung bases to the pubic symphysis.  Contrast was not administered.    COMPARISON:  None    FINDINGS:  There are old calcified granulomas of the liver and spleen.    The pancreas, adrenal glands, and gallbladder are unremarkable.    There are 2 adjacent stones within the right distal ureter, measuring 5 and 7 mm,  respectively.  This results in mild upstream right hydroureteronephrosis.  Additional stones are present within both kidneys. There is a small cyst of the right kidney.    The bowel is nondilated.  The appendix is normal.  The colon is unremarkable.  There is degenerative change and mild levoscoliosis of the lumbar spine.                                 Medications   sodium chloride 0.9% bolus 500 mL (0 mLs Intravenous Stopped 6/22/22 1805)   ondansetron injection 4 mg (4 mg Intravenous Given 6/22/22 1652)   HYDROmorphone (PF) injection 1 mg (1 mg Intravenous Given 6/22/22 1658)   ketorolac injection 15 mg (15 mg Intravenous Given 6/22/22 1658)     Medical Decision Making:   Differential Diagnosis:   Ureteral calculus, pyelonephritis, bowel obstruction, appendicitis, etc.  ED Management:  Patient's labs unremarkable except for his urinalysis which shows occult blood and red blood cells.  Likely ureteral calculus.  Will CT abdomen with stone protocol CT.  Patient's pain has been treated here and he is feeling better.  He was also given Zofran, and his nausea has improved significantly.  At shift change, his care will be transferred to Dr. Macedo who will await results of CT and make final disposition.    Patient signed out to me by Dr. Fry at shift change.  Patient found to have 2 distal ureteral stones on the right with mild hydronephrosis.  Patient pain-free at this time.  I think it is safe for discharge home.  He should be able to pass those stones.  Will place urology consult discharged Flomax, on antiemetics and pain medication.  ED return precautions given the patient.                    Clinical Impression:   Final diagnoses:  [N20.1] Ureteral stone (Primary)  [N13.30] Hydronephrosis, unspecified hydronephrosis type          ED Disposition Condition    Discharge Stable        ED Prescriptions     Medication Sig Dispense Start Date End Date Auth. Provider    ibuprofen (ADVIL,MOTRIN) 600 MG tablet Take 1  tablet (600 mg total) by mouth every 6 (six) hours as needed for Pain. 20 tablet 6/22/2022  Aaron Shine DO    HYDROcodone-acetaminophen (NORCO) 7.5-325 mg per tablet Take 1 tablet by mouth every 6 (six) hours as needed for Pain. 15 tablet 6/22/2022  Aaron Shine DO    ondansetron (ZOFRAN-ODT) 4 MG TbDL Take 1 tablet (4 mg total) by mouth every 8 (eight) hours as needed. 12 tablet 6/22/2022  Aaron Shine DO    tamsulosin (FLOMAX) 0.4 mg Cap Take 1 capsule (0.4 mg total) by mouth once daily. 20 capsule 6/22/2022 6/22/2023 Aaron Shine DO        Follow-up Information     Follow up With Specialties Details Why Contact Info    Lupillo Gregg MD Family Medicine In 3 days  [address]  [facility] [id number]  [phone]      [facility] Emergency Dept Emergency Medicine  As needed, If symptoms worsen [address] [facility] [address] [phone]    Urologist  In 3 days referral placed today            Aaron Shine DO  06/22/22 0758

## 2022-06-26 NOTE — H&P (VIEW-ONLY)
[facility] Urology New Patient/H&P:     Rory Cardona is a 66 y.o. male who presents for ER follow up of right ureteral calculi    [facility] ER 6/22/22: for R abdominal pain with nausea and vomiting.  He had a normal bowel movement.  He has been urinating normally.  His urine has been somewhat dark.  He denies history of kidney stone.  No history of bowel obstructions or diverticulitis.  No history of gallbladder disease or pancreatitis.  He denies fever but endorses chills. He states the pain waxes and wanes but is always present.  He describes it as a dull aching non localized pain.  Denies flank pain  Cr 1.3, eGFR 56.9, UA micro with 65 rbc o/w neg  CT: There are 2 adjacent stones within the right distal ureter, measuring 5 and 7 mm, respectively.  This results in mild upstream right hydroureteronephrosis.  Additional stones are present within both kidneys    He presents today noting  No known prior stone episodes.  Had one episode 30 years ago with some back pain after calling under house questionable spider bite he thinks it may have been a stone unknown.  Never diagnosed.  Personal review of CT scan notes these 2 adjacent stones in the distal ureter which on coronal, measure 1.2 cm and composite length, with the larger stone being more distal  Also has small 4-5mm LLP and 2 punctate LUP calcs, and small RLP calc, nonobst  Feels well today.  No significant flank pain.  Took a pain pill 1 day.  AUA SS 4/1 (1 freq, int, weak, sleep)  udip still lg blood       Past Medical History:   Diagnosis Date    Pressure sore of lower back        Past Surgical History:   Procedure Laterality Date    CYST REMOVAL      on Butt       History reviewed. No pertinent family history.    Social History     Socioeconomic History    Marital status: Unknown   Tobacco Use    Smoking status: Never Smoker    Smokeless tobacco: Never Used   Substance and Sexual Activity    Alcohol use: Yes     Comment: socially    Drug use: Yes      "Types: Marijuana     Comment: occ    Sexual activity: Not Currently       Review of patient's allergies indicates:  No Known Allergies    Medications Reviewed: see MAR    Focused Physical Exam    Vitals:    06/27/22 1151   BP: 111/71   Pulse: (!) 58   Resp: 18     Body mass index is 23.29 kg/m². Weight: 75.8 kg (167 lb) Height: 5' 11" (180.3 cm)       Abdomen: Soft, non-tender, nondistended, no CVA tenderness    LABS:    Recent Results (from the past 336 hour(s))   Urinalysis, Reflex to Urine Culture Urine, Clean Catch    Collection Time: 06/22/22  4:49 PM    Specimen: Urine   Result Value Ref Range    Specimen UA Urine, Unspecified     Color, UA Yellow Yellow, Straw, Gisela    Appearance, UA Clear Clear    pH, UA 8.0 5.0 - 8.0    Specific Gravity, UA 1.020 1.005 - 1.030    Protein, UA 1+ (A) Negative    Glucose, UA Negative Negative    Ketones, UA 2+ (A) Negative    Bilirubin (UA) Negative Negative    Occult Blood UA 3+ (A) Negative    Nitrite, UA Negative Negative    Urobilinogen, UA 1.0 Negative EU/dL    Leukocytes, UA Negative Negative   Urinalysis Microscopic    Collection Time: 06/22/22  4:49 PM   Result Value Ref Range    RBC, UA 65 (H) 0 - 4 /hpf    WBC, UA 0 0 - 5 /hpf    Bacteria Rare None-Occ /hpf    Squam Epithel, UA 0 /hpf    Hyaline Casts, UA 0 0-1/lpf /lpf    Microscopic Comment SEE COMMENT    CBC auto differential    Collection Time: 06/22/22  4:54 PM   Result Value Ref Range    WBC 10.92 3.90 - 12.70 K/uL    RBC 4.96 4.60 - 6.20 M/uL    Hemoglobin 15.0 14.0 - 18.0 g/dL    Hematocrit 43.9 40.0 - 54.0 %    MCV 89 82 - 98 fL    MCH 30.2 27.0 - 31.0 pg    MCHC 34.2 32.0 - 36.0 g/dL    RDW 12.9 11.5 - 14.5 %    Platelets 262 150 - 450 K/uL    MPV 9.9 9.2 - 12.9 fL    Immature Granulocytes 0.4 0.0 - 0.5 %    Gran # (ANC) 8.9 (H) 1.8 - 7.7 K/uL    Immature Grans (Abs) 0.04 0.00 - 0.04 K/uL    Lymph # 1.3 1.0 - 4.8 K/uL    Mono # 0.5 0.3 - 1.0 K/uL    Eos # 0.1 0.0 - 0.5 K/uL    Baso # 0.04 0.00 - 0.20 K/uL "    nRBC 0 0 /100 WBC    Gran % 81.8 (H) 38.0 - 73.0 %    Lymph % 11.9 (L) 18.0 - 48.0 %    Mono % 4.9 4.0 - 15.0 %    Eosinophil % 0.6 0.0 - 8.0 %    Basophil % 0.4 0.0 - 1.9 %    Differential Method Automated    Comprehensive metabolic panel    Collection Time: 06/22/22  4:54 PM   Result Value Ref Range    Sodium 143 136 - 145 mmol/L    Potassium 3.9 3.5 - 5.1 mmol/L    Chloride 107 95 - 110 mmol/L    CO2 24 23 - 29 mmol/L    Glucose 131 (H) 70 - 110 mg/dL    BUN 13 8 - 23 mg/dL    Creatinine 1.3 0.5 - 1.4 mg/dL    Calcium 9.4 8.7 - 10.5 mg/dL    Total Protein 7.5 6.0 - 8.4 g/dL    Albumin 4.3 3.5 - 5.2 g/dL    Total Bilirubin 0.7 0.1 - 1.0 mg/dL    Alkaline Phosphatase 63 55 - 135 U/L    AST 18 10 - 40 U/L    ALT 17 10 - 44 U/L    Anion Gap 12 8 - 16 mmol/L    eGFR if African American >60.0 >60 mL/min/1.73 m^2    eGFR if non  56.9 (A) >60 mL/min/1.73 m^2   Lipase    Collection Time: 06/22/22  4:54 PM   Result Value Ref Range    Lipase 39 4 - 60 U/L   POCT URINE DIPSTICK WITHOUT MICROSCOPE    Collection Time: 06/27/22 11:59 AM   Result Value Ref Range    Color, UA Yellow     pH, UA 6.5     WBC, UA neg     Nitrite, UA neg     Protein, POC neg     Glucose, UA neg     Ketones, UA neg     Urobilinogen, UA 0.2     Bilirubin, POC neg     Blood, UA large     Clarity, UA Clear     Spec Grav UA 1.020          Assessment/Diagnosis:    1. Right ureteral calculus  POCT URINE DIPSTICK WITHOUT MICROSCOPE    Case Request Operating Room: REMOVAL, CALCULUS, URETER, URETEROSCOPIC    Place in Outpatient    Diet NPO    Basic metabolic panel    CBC auto differential    Protime-INR    Urinalysis    Urine culture    EKG 12-lead    X-Ray Chest PA And Lateral    X-Ray Abdomen AP 1 View    US Retroperitoneal Complete (Kidney and    COVID-19 Routine Screening   2. Ureteral stone  Ambulatory referral/consult to Urology    tamsulosin (FLOMAX) 0.4 mg Cap   3. Hydronephrosis, unspecified hydronephrosis type  Ambulatory  referral/consult to Urology       Plans:  Extensively reviewed all labs, imaging, workup, notes from ER with presentation for right ureteral calculi.  Extensively reviewed CT imaging with patient denoting these to distal ureteral calculi, immediately adjacent to each other, with the larger 7 mm stone more distal, with a composite of length of about 12 mm within the distal ureter causing obstruction.  Urinalysis still demonstrates blood, and he is not provided a strainer but has not seen a stone pass.  We did demonstrate that is unlikely he will spontaneously pass both of the stones, and noted that a 5 mm stone in the kidney has 85% chance of spontaneous passage, thus higher rates with smaller size and more distal propagation.  The stones are the distal ureter, but of a composite are quite large size.  He was given Flomax, and he is relatively asymptomatic at this time and we did discuss initial trial of medical expulsive therapy by continuing Flomax, hydrating adequately, and straining all urine.  A stone strainer was provided.  However, noted that given the obstruction and size of stones, recommendation is to intervene at around 4 weeks from presentation to avoid further obstructive complications.  Reviewed options for intervention for stones such as lithotripsy and ureteroscopy, noting the formers on option with distal stone burden, and therefore described ureteroscopy with stone extraction laser lithotripsy in detail.  All questions answered and appropriate informed consent obtained and this was planned in the operating room on 7/28/22.    Rx for Flomax was extended.  Certainly if he should collect a stone, and definitely if he collects both stones, should notify us to send for chemical analysis and reimaging and cancel procedure.  Should he not pass both stones prior to the procedure, will proceed as planned.  Also included discussion about stone prevention recommendations which were provided in writing which  also help facilitate the passage of small nonobstructing stones.  ER return precautions were also discussed and provided in writing.  As well, at time of pre-admit testing visit 2 weeks prior, will get KUB and renal ultrasound for evaluation of any further distal propagation, presence of stones, or continued or worsening obstruction    Total time spent in/on encounter today, including face to face time with patient, counseling, medical record review, interpretation of tests/results, , and treatment plan coordination: 45 minutes

## 2022-06-26 NOTE — PROGRESS NOTES
[facility] Urology New Patient/H&P:     Rory Cardona is a 66 y.o. male who presents for ER follow up of right ureteral calculi    [place] ER 6/22/22: for R abdominal pain with nausea and vomiting.  He had a normal bowel movement.  He has been urinating normally.  His urine has been somewhat dark.  He denies history of kidney stone.  No history of bowel obstructions or diverticulitis.  No history of gallbladder disease or pancreatitis.  He denies fever but endorses chills. He states the pain waxes and wanes but is always present.  He describes it as a dull aching non localized pain.  Denies flank pain  Cr 1.3, eGFR 56.9, UA micro with 65 rbc o/w neg  CT: There are 2 adjacent stones within the right distal ureter, measuring 5 and 7 mm, respectively.  This results in mild upstream right hydroureteronephrosis.  Additional stones are present within both kidneys    He presents today noting  No known prior stone episodes.  Had one episode 30 years ago with some back pain after calling under house questionable spider bite he thinks it may have been a stone unknown.  Never diagnosed.  Personal review of CT scan notes these 2 adjacent stones in the distal ureter which on coronal, measure 1.2 cm and composite length, with the larger stone being more distal  Also has small 4-5mm LLP and 2 punctate LUP calcs, and small RLP calc, nonobst  Feels well today.  No significant flank pain.  Took a pain pill 1 day.  AUA SS 4/1 (1 freq, int, weak, sleep)  udip still lg blood       Past Medical History:   Diagnosis Date    Pressure sore of lower back        Past Surgical History:   Procedure Laterality Date    CYST REMOVAL      on Butt       History reviewed. No pertinent family history.    Social History     Socioeconomic History    Marital status: Unknown   Tobacco Use    Smoking status: Never Smoker    Smokeless tobacco: Never Used   Substance and Sexual Activity    Alcohol use: Yes     Comment: socially    Drug use: Yes      "Types: Marijuana     Comment: occ    Sexual activity: Not Currently       Review of patient's allergies indicates:  No Known Allergies    Medications Reviewed: see MAR    Focused Physical Exam    Vitals:    06/27/22 1151   BP: 111/71   Pulse: (!) 58   Resp: 18     Body mass index is 23.29 kg/m². Weight: 75.8 kg (167 lb) Height: 5' 11" (180.3 cm)       Abdomen: Soft, non-tender, nondistended, no CVA tenderness    LABS:    Recent Results (from the past 336 hour(s))   Urinalysis, Reflex to Urine Culture Urine, Clean Catch    Collection Time: 06/22/22  4:49 PM    Specimen: Urine   Result Value Ref Range    Specimen UA Urine, Unspecified     Color, UA Yellow Yellow, Straw, Gisela    Appearance, UA Clear Clear    pH, UA 8.0 5.0 - 8.0    Specific Gravity, UA 1.020 1.005 - 1.030    Protein, UA 1+ (A) Negative    Glucose, UA Negative Negative    Ketones, UA 2+ (A) Negative    Bilirubin (UA) Negative Negative    Occult Blood UA 3+ (A) Negative    Nitrite, UA Negative Negative    Urobilinogen, UA 1.0 Negative EU/dL    Leukocytes, UA Negative Negative   Urinalysis Microscopic    Collection Time: 06/22/22  4:49 PM   Result Value Ref Range    RBC, UA 65 (H) 0 - 4 /hpf    WBC, UA 0 0 - 5 /hpf    Bacteria Rare None-Occ /hpf    Squam Epithel, UA 0 /hpf    Hyaline Casts, UA 0 0-1/lpf /lpf    Microscopic Comment SEE COMMENT    CBC auto differential    Collection Time: 06/22/22  4:54 PM   Result Value Ref Range    WBC 10.92 3.90 - 12.70 K/uL    RBC 4.96 4.60 - 6.20 M/uL    Hemoglobin 15.0 14.0 - 18.0 g/dL    Hematocrit 43.9 40.0 - 54.0 %    MCV 89 82 - 98 fL    MCH 30.2 27.0 - 31.0 pg    MCHC 34.2 32.0 - 36.0 g/dL    RDW 12.9 11.5 - 14.5 %    Platelets 262 150 - 450 K/uL    MPV 9.9 9.2 - 12.9 fL    Immature Granulocytes 0.4 0.0 - 0.5 %    Gran # (ANC) 8.9 (H) 1.8 - 7.7 K/uL    Immature Grans (Abs) 0.04 0.00 - 0.04 K/uL    Lymph # 1.3 1.0 - 4.8 K/uL    Mono # 0.5 0.3 - 1.0 K/uL    Eos # 0.1 0.0 - 0.5 K/uL    Baso # 0.04 0.00 - 0.20 K/uL "    nRBC 0 0 /100 WBC    Gran % 81.8 (H) 38.0 - 73.0 %    Lymph % 11.9 (L) 18.0 - 48.0 %    Mono % 4.9 4.0 - 15.0 %    Eosinophil % 0.6 0.0 - 8.0 %    Basophil % 0.4 0.0 - 1.9 %    Differential Method Automated    Comprehensive metabolic panel    Collection Time: 06/22/22  4:54 PM   Result Value Ref Range    Sodium 143 136 - 145 mmol/L    Potassium 3.9 3.5 - 5.1 mmol/L    Chloride 107 95 - 110 mmol/L    CO2 24 23 - 29 mmol/L    Glucose 131 (H) 70 - 110 mg/dL    BUN 13 8 - 23 mg/dL    Creatinine 1.3 0.5 - 1.4 mg/dL    Calcium 9.4 8.7 - 10.5 mg/dL    Total Protein 7.5 6.0 - 8.4 g/dL    Albumin 4.3 3.5 - 5.2 g/dL    Total Bilirubin 0.7 0.1 - 1.0 mg/dL    Alkaline Phosphatase 63 55 - 135 U/L    AST 18 10 - 40 U/L    ALT 17 10 - 44 U/L    Anion Gap 12 8 - 16 mmol/L    eGFR if African American >60.0 >60 mL/min/1.73 m^2    eGFR if non  56.9 (A) >60 mL/min/1.73 m^2   Lipase    Collection Time: 06/22/22  4:54 PM   Result Value Ref Range    Lipase 39 4 - 60 U/L   POCT URINE DIPSTICK WITHOUT MICROSCOPE    Collection Time: 06/27/22 11:59 AM   Result Value Ref Range    Color, UA Yellow     pH, UA 6.5     WBC, UA neg     Nitrite, UA neg     Protein, POC neg     Glucose, UA neg     Ketones, UA neg     Urobilinogen, UA 0.2     Bilirubin, POC neg     Blood, UA large     Clarity, UA Clear     Spec Grav UA 1.020          Assessment/Diagnosis:    1. Right ureteral calculus  POCT URINE DIPSTICK WITHOUT MICROSCOPE    Case Request Operating Room: REMOVAL, CALCULUS, URETER, URETEROSCOPIC    Place in Outpatient    Diet NPO    Basic metabolic panel    CBC auto differential    Protime-INR    Urinalysis    Urine culture    EKG 12-lead    X-Ray Chest PA And Lateral    X-Ray Abdomen AP 1 View    US Retroperitoneal Complete (Kidney and    COVID-19 Routine Screening   2. Ureteral stone  Ambulatory referral/consult to Urology    tamsulosin (FLOMAX) 0.4 mg Cap   3. Hydronephrosis, unspecified hydronephrosis type  Ambulatory  referral/consult to Urology       Plans:  Extensively reviewed all labs, imaging, workup, notes from ER with presentation for right ureteral calculi.  Extensively reviewed CT imaging with patient denoting these to distal ureteral calculi, immediately adjacent to each other, with the larger 7 mm stone more distal, with a composite of length of about 12 mm within the distal ureter causing obstruction.  Urinalysis still demonstrates blood, and he is not provided a strainer but has not seen a stone pass.  We did demonstrate that is unlikely he will spontaneously pass both of the stones, and noted that a 5 mm stone in the kidney has 85% chance of spontaneous passage, thus higher rates with smaller size and more distal propagation.  The stones are the distal ureter, but of a composite are quite large size.  He was given Flomax, and he is relatively asymptomatic at this time and we did discuss initial trial of medical expulsive therapy by continuing Flomax, hydrating adequately, and straining all urine.  A stone strainer was provided.  However, noted that given the obstruction and size of stones, recommendation is to intervene at around 4 weeks from presentation to avoid further obstructive complications.  Reviewed options for intervention for stones such as lithotripsy and ureteroscopy, noting the formers on option with distal stone burden, and therefore described ureteroscopy with stone extraction laser lithotripsy in detail.  All questions answered and appropriate informed consent obtained and this was planned in the operating room on 7/28/22.    Rx for Flomax was extended.  Certainly if he should collect a stone, and definitely if he collects both stones, should notify us to send for chemical analysis and reimaging and cancel procedure.  Should he not pass both stones prior to the procedure, will proceed as planned.  Also included discussion about stone prevention recommendations which were provided in writing which  also help facilitate the passage of small nonobstructing stones.  ER return precautions were also discussed and provided in writing.  As well, at time of pre-admit testing visit 2 weeks prior, will get KUB and renal ultrasound for evaluation of any further distal propagation, presence of stones, or continued or worsening obstruction    Total time spent in/on encounter today, including face to face time with patient, counseling, medical record review, interpretation of tests/results, , and treatment plan coordination: 45 minutes

## 2022-06-27 ENCOUNTER — OFFICE VISIT (OUTPATIENT)
Dept: UROLOGY | Facility: CLINIC | Age: 67
End: 2022-06-27
Payer: MEDICARE

## 2022-06-27 VITALS
RESPIRATION RATE: 18 BRPM | BODY MASS INDEX: 23.38 KG/M2 | DIASTOLIC BLOOD PRESSURE: 71 MMHG | HEIGHT: 71 IN | WEIGHT: 167 LBS | HEART RATE: 58 BPM | SYSTOLIC BLOOD PRESSURE: 111 MMHG

## 2022-06-27 DIAGNOSIS — N20.1 URETERAL STONE: ICD-10-CM

## 2022-06-27 DIAGNOSIS — N20.1 RIGHT URETERAL CALCULUS: Primary | ICD-10-CM

## 2022-06-27 DIAGNOSIS — N13.30 HYDRONEPHROSIS, UNSPECIFIED HYDRONEPHROSIS TYPE: ICD-10-CM

## 2022-06-27 LAB
BILIRUB SERPL-MCNC: ABNORMAL MG/DL
BLOOD URINE, POC: ABNORMAL
CLARITY, POC UA: CLEAR
COLOR, POC UA: YELLOW
GLUCOSE UR QL STRIP: ABNORMAL
KETONES UR QL STRIP: ABNORMAL
LEUKOCYTE ESTERASE URINE, POC: ABNORMAL
NITRITE, POC UA: ABNORMAL
PH, POC UA: 6.5
PROTEIN, POC: ABNORMAL
SPECIFIC GRAVITY, POC UA: 1.02
UROBILINOGEN, POC UA: 0.2

## 2022-06-27 PROCEDURE — 1126F PR PAIN SEVERITY QUANTIFIED, NO PAIN PRESENT: ICD-10-PCS | Mod: CPTII,S$GLB,, | Performed by: UROLOGY

## 2022-06-27 PROCEDURE — 3288F FALL RISK ASSESSMENT DOCD: CPT | Mod: CPTII,S$GLB,, | Performed by: UROLOGY

## 2022-06-27 PROCEDURE — 99204 PR OFFICE/OUTPT VISIT, NEW, LEVL IV, 45-59 MIN: ICD-10-PCS | Mod: S$GLB,,, | Performed by: UROLOGY

## 2022-06-27 PROCEDURE — 1160F PR REVIEW ALL MEDS BY PRESCRIBER/CLIN PHARMACIST DOCUMENTED: ICD-10-PCS | Mod: CPTII,S$GLB,, | Performed by: UROLOGY

## 2022-06-27 PROCEDURE — 3074F SYST BP LT 130 MM HG: CPT | Mod: CPTII,S$GLB,, | Performed by: UROLOGY

## 2022-06-27 PROCEDURE — 81002 POCT URINE DIPSTICK WITHOUT MICROSCOPE: ICD-10-PCS | Mod: S$GLB,,, | Performed by: UROLOGY

## 2022-06-27 PROCEDURE — 99999 PR PBB SHADOW E&M-EST. PATIENT-LVL V: ICD-10-PCS | Mod: PBBFAC,,, | Performed by: UROLOGY

## 2022-06-27 PROCEDURE — 1159F PR MEDICATION LIST DOCUMENTED IN MEDICAL RECORD: ICD-10-PCS | Mod: CPTII,S$GLB,, | Performed by: UROLOGY

## 2022-06-27 PROCEDURE — 1159F MED LIST DOCD IN RCRD: CPT | Mod: CPTII,S$GLB,, | Performed by: UROLOGY

## 2022-06-27 PROCEDURE — 3008F PR BODY MASS INDEX (BMI) DOCUMENTED: ICD-10-PCS | Mod: CPTII,S$GLB,, | Performed by: UROLOGY

## 2022-06-27 PROCEDURE — 3074F PR MOST RECENT SYSTOLIC BLOOD PRESSURE < 130 MM HG: ICD-10-PCS | Mod: CPTII,S$GLB,, | Performed by: UROLOGY

## 2022-06-27 PROCEDURE — 99204 OFFICE O/P NEW MOD 45 MIN: CPT | Mod: S$GLB,,, | Performed by: UROLOGY

## 2022-06-27 PROCEDURE — 1101F PT FALLS ASSESS-DOCD LE1/YR: CPT | Mod: CPTII,S$GLB,, | Performed by: UROLOGY

## 2022-06-27 PROCEDURE — 3288F PR FALLS RISK ASSESSMENT DOCUMENTED: ICD-10-PCS | Mod: CPTII,S$GLB,, | Performed by: UROLOGY

## 2022-06-27 PROCEDURE — 1126F AMNT PAIN NOTED NONE PRSNT: CPT | Mod: CPTII,S$GLB,, | Performed by: UROLOGY

## 2022-06-27 PROCEDURE — 1160F RVW MEDS BY RX/DR IN RCRD: CPT | Mod: CPTII,S$GLB,, | Performed by: UROLOGY

## 2022-06-27 PROCEDURE — 81002 URINALYSIS NONAUTO W/O SCOPE: CPT | Mod: S$GLB,,, | Performed by: UROLOGY

## 2022-06-27 PROCEDURE — 3078F DIAST BP <80 MM HG: CPT | Mod: CPTII,S$GLB,, | Performed by: UROLOGY

## 2022-06-27 PROCEDURE — 3078F PR MOST RECENT DIASTOLIC BLOOD PRESSURE < 80 MM HG: ICD-10-PCS | Mod: CPTII,S$GLB,, | Performed by: UROLOGY

## 2022-06-27 PROCEDURE — 3008F BODY MASS INDEX DOCD: CPT | Mod: CPTII,S$GLB,, | Performed by: UROLOGY

## 2022-06-27 PROCEDURE — 1101F PR PT FALLS ASSESS DOC 0-1 FALLS W/OUT INJ PAST YR: ICD-10-PCS | Mod: CPTII,S$GLB,, | Performed by: UROLOGY

## 2022-06-27 PROCEDURE — 99999 PR PBB SHADOW E&M-EST. PATIENT-LVL V: CPT | Mod: PBBFAC,,, | Performed by: UROLOGY

## 2022-06-27 RX ORDER — TAMSULOSIN HYDROCHLORIDE 0.4 MG/1
0.4 CAPSULE ORAL DAILY
Qty: 30 CAPSULE | Refills: 2 | Status: ON HOLD | OUTPATIENT
Start: 2022-06-27 | End: 2022-08-09 | Stop reason: HOSPADM

## 2022-06-27 NOTE — PATIENT INSTRUCTIONS
For future stone prevention, we primarily discussed adequate hydration to produce a goal daily urine output of greater than 2 liters.    We discussed a stone prevention diet as low salt/low sodium (frozen fast fried processed jerkey deli meat), moderate protein, low oxalate (especially avoid tea/iced tea), minimize cola/diet soda, and adding fresh lemon to water/drinking fresh lemonade, add citrus to diet. Citrate (citrus) is a stone inhibitor.    Continue Flomax 0.4 mg nightly until completion of stone procedure and stent is removed or until all stones have passed    ER return precautions:  Present to [facility] emergency room if any fever/chills, temperature greater than 101, or severe nausea vomiting or pain uncontrollable at home

## 2022-07-08 ENCOUNTER — TELEPHONE (OUTPATIENT)
Dept: UROLOGY | Facility: CLINIC | Age: 67
End: 2022-07-08
Payer: MEDICARE

## 2022-07-08 ENCOUNTER — HOSPITAL ENCOUNTER (OUTPATIENT)
Dept: PREADMISSION TESTING | Facility: HOSPITAL | Age: 67
Discharge: HOME | End: 2022-07-08
Attending: UROLOGY
Payer: MEDICARE

## 2022-07-08 DIAGNOSIS — N20.1 RIGHT URETERAL CALCULUS: ICD-10-CM

## 2022-07-08 NOTE — TELEPHONE ENCOUNTER
----- Message from Monserrat Butler sent at 7/8/2022 12:59 PM CDT -----  Regarding: Pt called to speak to the nurse regarding his appts today and would like a call back due to car trouble  Patient Advice:    Pt called to speak to the nurse regarding his appts today and would like a call back due to car trouble    Pt can be reached at [phone]

## 2022-07-13 ENCOUNTER — HOSPITAL ENCOUNTER (OUTPATIENT)
Dept: RADIOLOGY | Facility: HOSPITAL | Age: 67
Discharge: HOME | End: 2022-07-13
Attending: UROLOGY
Payer: MEDICARE

## 2022-07-13 ENCOUNTER — HOSPITAL ENCOUNTER (OUTPATIENT)
Dept: PREADMISSION TESTING | Facility: HOSPITAL | Age: 67
Discharge: HOME | End: 2022-07-13
Attending: UROLOGY
Payer: MEDICARE

## 2022-07-13 VITALS — WEIGHT: 167 LBS | BODY MASS INDEX: 23.38 KG/M2 | HEIGHT: 71 IN

## 2022-07-13 DIAGNOSIS — N20.1 RIGHT URETERAL CALCULUS: ICD-10-CM

## 2022-07-13 PROCEDURE — 93005 ELECTROCARDIOGRAM TRACING: CPT

## 2022-07-13 PROCEDURE — 93010 EKG 12-LEAD: ICD-10-PCS | Mod: ,,, | Performed by: GENERAL PRACTICE

## 2022-07-13 PROCEDURE — 74018 RADEX ABDOMEN 1 VIEW: CPT | Mod: TC,FY

## 2022-07-13 PROCEDURE — 76770 US RETROPERITONEAL COMPLETE: ICD-10-PCS | Mod: 26,,, | Performed by: RADIOLOGY

## 2022-07-13 PROCEDURE — 76770 US EXAM ABDO BACK WALL COMP: CPT | Mod: 26,,, | Performed by: RADIOLOGY

## 2022-07-13 PROCEDURE — 74018 XR ABDOMEN AP 1 VIEW: ICD-10-PCS | Mod: 26,,, | Performed by: RADIOLOGY

## 2022-07-13 PROCEDURE — 76770 US EXAM ABDO BACK WALL COMP: CPT | Mod: TC

## 2022-07-13 PROCEDURE — 74018 RADEX ABDOMEN 1 VIEW: CPT | Mod: 26,,, | Performed by: RADIOLOGY

## 2022-07-13 PROCEDURE — 99900103 DSU ONLY-NO CHARGE-INITIAL HR (STAT)

## 2022-07-13 PROCEDURE — 71046 X-RAY EXAM CHEST 2 VIEWS: CPT | Mod: TC,FY

## 2022-07-13 PROCEDURE — 71046 XR CHEST PA AND LATERAL: ICD-10-PCS | Mod: 26,,, | Performed by: RADIOLOGY

## 2022-07-13 PROCEDURE — 93010 ELECTROCARDIOGRAM REPORT: CPT | Mod: ,,, | Performed by: GENERAL PRACTICE

## 2022-07-13 PROCEDURE — 71046 X-RAY EXAM CHEST 2 VIEWS: CPT | Mod: 26,,, | Performed by: RADIOLOGY

## 2022-07-13 PROCEDURE — 99900104 DSU ONLY-NO CHARGE-EA ADD'L HR (STAT)

## 2022-07-13 NOTE — DISCHARGE INSTRUCTIONS
To confirm, Your doctor has instructed you that surgery is scheduled for: 7/28/22    [place]  [phone]    Please report to [facility], [facility] the morning of surgery. You must check-in and receive a wristband before going to your procedure.    Pre-Op will call the afternoon prior to surgery between 1:00 and 6:00 PM with the final arrival time.  Phone number: [phone]    PLEASE NOTE:  The surgery schedule has many variables which may affect the time of your surgery case.  Family members should be available if your surgery time changes.  Plan to be here the day of your procedure between 4-6 hours.    MEDICATIONS:  TAKE ONLY THESE MEDICATIONS WITH A SMALL SIP OF WATER THE MORNING OF YOUR PROCEDURE:    See list    DO NOT TAKE THESE MEDICATIONS 5-7 DAYS PRIOR to your procedure or per your surgeon's request:   ASPIRIN, ALEVE, ADVIL, IBUPROFEN, FISH OIL VITAMIN E, HERBALS    (May take Tylenol)    ONLY if you are prescribed any types of blood thinners such as:  Aspirin, Coumadin, Plavix, Pradaxa, Xarelto, Aggrenox, Effient, Eliquis, Savasya, Brilinta, or any other, ask your surgeon whether you should stop taking them and how long before surgery you should stop.  You may also need to verify with the prescribing physician if it is ok to stop your medication.      INSTRUCTIONS IMPORTANT!!  Do not eat or drink anything between midnight and the time of your procedure- this includes gum, mints, and candy.  Do not smoke or drink alcoholic beverages 24 hours prior to your procedure.  Shower the night before AND the morning of your procedure with a Chlorhexidine wash such as Hibiclens or Dial antibacterial soap from the neck down.  Do not get it on your face or in your eyes.  You may use your own shampoo and face wash. This helps your skin to be as bacteria free as possible.    If you wear contact lenses, dentures, hearing aids or glasses, bring a container to put them in during surgery and give to  a family member for safe keeping.  Please leave all jewelry, piercing's and valuables at home.   DO NOT remove hair from the surgery site.  Do not shave the incision site unless you are given specific instructions to do so.    ONLY if you have been diagnosed with sleep apnea please bring your C-PAP machine.  ONLY if you wear home oxygen please bring your portable oxygen tank the day of your procedure.  ONLY if you have a history of OPEN HEART SURGERY you will need a clearance from your Cardiologist per Anesthesia.      ONLY for patients requiring bowel prep, written instructions will be given by your doctor's office.  ONLY if you have a neuro stimulator, please bring the controller with you the morning of surgery  ONLY if a type and screen test is needed before surgery, please return:  If your doctor has scheduled you for an overnight stay, bring a small overnight bag with any personal items you need.  Make arrangements in advance for transportation home by a responsible adult.  It is not safe to drive a vehicle during the 24 hours after anesthesia.       [facility] will resume routine visitation for COVID-19 negative patients, including inpatients, outpatients, and  procedural areas. Visitors are still required to practice social distancing in waiting rooms, cafeterias, and common  areas. Visitors and patients should maintain six feet distance between those not in their group. Visitors will be  asked to leave if they exhibit symptoms of respiratory infection, have tested positive for COVID -19 in the last  ten days, have a pending COVID-19 test for symptoms.    All [facility] facilities and properties are tobacco free.  Smoking is NOT allowed.   If you have any questions about these instructions, call Pre-Op Admit  Nursing at [phone] or the Pre-Op Day Surgery Unit at [phone].

## 2022-07-14 DIAGNOSIS — N20.1 RIGHT URETERAL CALCULUS: Primary | ICD-10-CM

## 2022-07-15 NOTE — PROGRESS NOTES
"Can advise no obstruction on ultrasound and that his Xray notes only "phleboliths in the pelvis" consistent with pelvic vein calcifications and doesn't idenfity stones from CT. However on my review while I believe Left sided calcification is phlebolith, it looks like stackd right calcifications over bladder suggesting his ureteral stones from CT are still present.   Given this vague screening imaging, would recommend limited noncon CT pelvis to definitively rule in/rule our right ureteral stones before planned stone extraction  Order placed please schedule next week - lives in [place] so eliana or luisito ok"

## 2022-07-21 ENCOUNTER — HOSPITAL ENCOUNTER (OUTPATIENT)
Facility: HOSPITAL | Age: 67
Discharge: HOME | End: 2022-07-21
Attending: UROLOGY | Admitting: UROLOGY
Payer: MEDICARE

## 2022-07-21 ENCOUNTER — ANESTHESIA (OUTPATIENT)
Dept: SURGERY | Facility: HOSPITAL | Age: 67
End: 2022-07-21
Payer: MEDICARE

## 2022-07-21 ENCOUNTER — ANESTHESIA EVENT (OUTPATIENT)
Dept: SURGERY | Facility: HOSPITAL | Age: 67
End: 2022-07-21
Payer: MEDICARE

## 2022-07-21 DIAGNOSIS — N20.1 RIGHT URETERAL CALCULUS: ICD-10-CM

## 2022-07-21 LAB
CTP QC/QA: YES
SARS-COV-2 AG RESP QL IA.RAPID: NEGATIVE

## 2022-07-21 PROCEDURE — D9220A PRA ANESTHESIA: Mod: ANES,,, | Performed by: ANESTHESIOLOGY

## 2022-07-21 PROCEDURE — 63600175 PHARM REV CODE 636 W HCPCS: Performed by: ANESTHESIOLOGY

## 2022-07-21 PROCEDURE — C1894 INTRO/SHEATH, NON-LASER: HCPCS | Performed by: UROLOGY

## 2022-07-21 PROCEDURE — 71000039 HC RECOVERY, EACH ADD'L HOUR: Performed by: UROLOGY

## 2022-07-21 PROCEDURE — 99900103 DSU ONLY-NO CHARGE-INITIAL HR (STAT): Performed by: UROLOGY

## 2022-07-21 PROCEDURE — 25000003 PHARM REV CODE 250: Performed by: ANESTHESIOLOGY

## 2022-07-21 PROCEDURE — 36000707: Performed by: UROLOGY

## 2022-07-21 PROCEDURE — 52356 CYSTO/URETERO W/LITHOTRIPSY: CPT | Mod: RT,,, | Performed by: UROLOGY

## 2022-07-21 PROCEDURE — C1769 GUIDE WIRE: HCPCS | Performed by: UROLOGY

## 2022-07-21 PROCEDURE — 71000033 HC RECOVERY, INTIAL HOUR: Performed by: UROLOGY

## 2022-07-21 PROCEDURE — 52356 PR CYSTO/URETERO W/LITHOTRIPSY: ICD-10-PCS | Mod: RT,,, | Performed by: UROLOGY

## 2022-07-21 PROCEDURE — C2617 STENT, NON-COR, TEM W/O DEL: HCPCS | Performed by: UROLOGY

## 2022-07-21 PROCEDURE — C1758 CATHETER, URETERAL: HCPCS | Performed by: UROLOGY

## 2022-07-21 PROCEDURE — 82365 CALCULUS SPECTROSCOPY: CPT | Performed by: UROLOGY

## 2022-07-21 PROCEDURE — 37000009 HC ANESTHESIA EA ADD 15 MINS: Performed by: UROLOGY

## 2022-07-21 PROCEDURE — 74420 UROGRAPHY RTRGR +-KUB: CPT | Mod: 26,,, | Performed by: UROLOGY

## 2022-07-21 PROCEDURE — 63600175 PHARM REV CODE 636 W HCPCS: Performed by: NURSE ANESTHETIST, CERTIFIED REGISTERED

## 2022-07-21 PROCEDURE — 25500020 PHARM REV CODE 255: Performed by: UROLOGY

## 2022-07-21 PROCEDURE — D9220A PRA ANESTHESIA: ICD-10-PCS | Mod: CRNA,,, | Performed by: NURSE ANESTHETIST, CERTIFIED REGISTERED

## 2022-07-21 PROCEDURE — D9220A PRA ANESTHESIA: ICD-10-PCS | Mod: ANES,,, | Performed by: ANESTHESIOLOGY

## 2022-07-21 PROCEDURE — 99900104 DSU ONLY-NO CHARGE-EA ADD'L HR (STAT): Performed by: UROLOGY

## 2022-07-21 PROCEDURE — 74420 PR  X-RAY RETROGRADE PYELOGRAM: ICD-10-PCS | Mod: 26,,, | Performed by: UROLOGY

## 2022-07-21 PROCEDURE — 25000003 PHARM REV CODE 250: Performed by: NURSE ANESTHETIST, CERTIFIED REGISTERED

## 2022-07-21 PROCEDURE — 36000706: Performed by: UROLOGY

## 2022-07-21 PROCEDURE — 37000008 HC ANESTHESIA 1ST 15 MINUTES: Performed by: UROLOGY

## 2022-07-21 PROCEDURE — 27201423 OPTIME MED/SURG SUP & DEVICES STERILE SUPPLY: Performed by: UROLOGY

## 2022-07-21 PROCEDURE — D9220A PRA ANESTHESIA: Mod: CRNA,,, | Performed by: NURSE ANESTHETIST, CERTIFIED REGISTERED

## 2022-07-21 PROCEDURE — 71000015 HC POSTOP RECOV 1ST HR: Performed by: UROLOGY

## 2022-07-21 DEVICE — STENT URETERAL UNIV 7FR 24CM
Type: IMPLANTABLE DEVICE | Site: URETER | Status: NON-FUNCTIONAL
Removed: 2022-08-09

## 2022-07-21 RX ORDER — FENTANYL CITRATE 50 UG/ML
INJECTION, SOLUTION INTRAMUSCULAR; INTRAVENOUS
Status: DISCONTINUED | OUTPATIENT
Start: 2022-07-21 | End: 2022-07-21

## 2022-07-21 RX ORDER — HYDROMORPHONE HYDROCHLORIDE 2 MG/ML
0.2 INJECTION, SOLUTION INTRAMUSCULAR; INTRAVENOUS; SUBCUTANEOUS EVERY 5 MIN PRN
Status: DISCONTINUED | OUTPATIENT
Start: 2022-07-21 | End: 2022-07-21 | Stop reason: HOSPADM

## 2022-07-21 RX ORDER — OXYCODONE HYDROCHLORIDE 5 MG/1
5 TABLET ORAL ONCE AS NEEDED
Status: COMPLETED | OUTPATIENT
Start: 2022-07-21 | End: 2022-07-21

## 2022-07-21 RX ORDER — CEFAZOLIN SODIUM 2 G/50ML
2 SOLUTION INTRAVENOUS
Status: DISCONTINUED | OUTPATIENT
Start: 2022-07-21 | End: 2022-07-21 | Stop reason: HOSPADM

## 2022-07-21 RX ORDER — KETOROLAC TROMETHAMINE 30 MG/ML
INJECTION, SOLUTION INTRAMUSCULAR; INTRAVENOUS
Status: DISCONTINUED | OUTPATIENT
Start: 2022-07-21 | End: 2022-07-21

## 2022-07-21 RX ORDER — NEOSTIGMINE METHYLSULFATE 1 MG/ML
INJECTION, SOLUTION INTRAVENOUS
Status: DISCONTINUED | OUTPATIENT
Start: 2022-07-21 | End: 2022-07-21

## 2022-07-21 RX ORDER — ACETAMINOPHEN 10 MG/ML
INJECTION, SOLUTION INTRAVENOUS
Status: DISCONTINUED | OUTPATIENT
Start: 2022-07-21 | End: 2022-07-21

## 2022-07-21 RX ORDER — ROCURONIUM BROMIDE 10 MG/ML
INJECTION, SOLUTION INTRAVENOUS
Status: DISCONTINUED | OUTPATIENT
Start: 2022-07-21 | End: 2022-07-21

## 2022-07-21 RX ORDER — SULFAMETHOXAZOLE AND TRIMETHOPRIM 800; 160 MG/1; MG/1
1 TABLET ORAL 2 TIMES DAILY
Qty: 10 TABLET | Refills: 0 | Status: SHIPPED | OUTPATIENT
Start: 2022-07-21 | End: 2022-07-26

## 2022-07-21 RX ORDER — EPHEDRINE SULFATE 50 MG/ML
INJECTION, SOLUTION INTRAVENOUS
Status: DISCONTINUED | OUTPATIENT
Start: 2022-07-21 | End: 2022-07-21

## 2022-07-21 RX ORDER — FENTANYL CITRATE 50 UG/ML
25 INJECTION, SOLUTION INTRAMUSCULAR; INTRAVENOUS EVERY 5 MIN PRN
Status: DISCONTINUED | OUTPATIENT
Start: 2022-07-21 | End: 2022-07-21 | Stop reason: HOSPADM

## 2022-07-21 RX ORDER — PROPOFOL 10 MG/ML
VIAL (ML) INTRAVENOUS
Status: DISCONTINUED | OUTPATIENT
Start: 2022-07-21 | End: 2022-07-21

## 2022-07-21 RX ORDER — MEPERIDINE HYDROCHLORIDE 50 MG/ML
12.5 INJECTION INTRAMUSCULAR; INTRAVENOUS; SUBCUTANEOUS ONCE AS NEEDED
Status: DISCONTINUED | OUTPATIENT
Start: 2022-07-21 | End: 2022-07-21 | Stop reason: HOSPADM

## 2022-07-21 RX ORDER — LIDOCAINE HYDROCHLORIDE 10 MG/ML
1 INJECTION, SOLUTION EPIDURAL; INFILTRATION; INTRACAUDAL; PERINEURAL ONCE
Status: DISCONTINUED | OUTPATIENT
Start: 2022-07-21 | End: 2022-07-21 | Stop reason: HOSPADM

## 2022-07-21 RX ORDER — ONDANSETRON 2 MG/ML
INJECTION INTRAMUSCULAR; INTRAVENOUS
Status: DISCONTINUED | OUTPATIENT
Start: 2022-07-21 | End: 2022-07-21

## 2022-07-21 RX ORDER — PROCHLORPERAZINE EDISYLATE 5 MG/ML
5 INJECTION INTRAMUSCULAR; INTRAVENOUS EVERY 30 MIN PRN
Status: DISCONTINUED | OUTPATIENT
Start: 2022-07-21 | End: 2022-07-21 | Stop reason: HOSPADM

## 2022-07-21 RX ORDER — LIDOCAINE HYDROCHLORIDE 20 MG/ML
INJECTION INTRAVENOUS
Status: DISCONTINUED | OUTPATIENT
Start: 2022-07-21 | End: 2022-07-21

## 2022-07-21 RX ORDER — SUCCINYLCHOLINE CHLORIDE 20 MG/ML
INJECTION INTRAMUSCULAR; INTRAVENOUS
Status: DISCONTINUED | OUTPATIENT
Start: 2022-07-21 | End: 2022-07-21

## 2022-07-21 RX ORDER — DEXAMETHASONE SODIUM PHOSPHATE 4 MG/ML
INJECTION, SOLUTION INTRA-ARTICULAR; INTRALESIONAL; INTRAMUSCULAR; INTRAVENOUS; SOFT TISSUE
Status: DISCONTINUED | OUTPATIENT
Start: 2022-07-21 | End: 2022-07-21

## 2022-07-21 RX ORDER — DIPHENHYDRAMINE HYDROCHLORIDE 50 MG/ML
12.5 INJECTION INTRAMUSCULAR; INTRAVENOUS ONCE AS NEEDED
Status: DISCONTINUED | OUTPATIENT
Start: 2022-07-21 | End: 2022-07-21 | Stop reason: HOSPADM

## 2022-07-21 RX ORDER — SODIUM CHLORIDE, SODIUM LACTATE, POTASSIUM CHLORIDE, CALCIUM CHLORIDE 600; 310; 30; 20 MG/100ML; MG/100ML; MG/100ML; MG/100ML
INJECTION, SOLUTION INTRAVENOUS CONTINUOUS
Status: DISCONTINUED | OUTPATIENT
Start: 2022-07-21 | End: 2022-07-21 | Stop reason: HOSPADM

## 2022-07-21 RX ORDER — ONDANSETRON 2 MG/ML
4 INJECTION INTRAMUSCULAR; INTRAVENOUS ONCE AS NEEDED
Status: DISCONTINUED | OUTPATIENT
Start: 2022-07-21 | End: 2022-07-21 | Stop reason: HOSPADM

## 2022-07-21 RX ORDER — MIDAZOLAM HYDROCHLORIDE 1 MG/ML
INJECTION, SOLUTION INTRAMUSCULAR; INTRAVENOUS
Status: DISCONTINUED | OUTPATIENT
Start: 2022-07-21 | End: 2022-07-21

## 2022-07-21 RX ADMIN — GLYCOPYRROLATE 0.2 MG: 0.2 INJECTION, SOLUTION INTRAMUSCULAR; INTRAVENOUS at 01:07

## 2022-07-21 RX ADMIN — LIDOCAINE HYDROCHLORIDE 100 MG: 20 INJECTION, SOLUTION INTRAVENOUS at 01:07

## 2022-07-21 RX ADMIN — EPHEDRINE SULFATE 10 MG: 50 INJECTION, SOLUTION INTRAMUSCULAR; INTRAVENOUS; SUBCUTANEOUS at 01:07

## 2022-07-21 RX ADMIN — ACETAMINOPHEN 1000 MG: 10 INJECTION, SOLUTION INTRAVENOUS at 01:07

## 2022-07-21 RX ADMIN — KETOROLAC TROMETHAMINE 30 MG: 30 INJECTION, SOLUTION INTRAMUSCULAR; INTRAVENOUS at 02:07

## 2022-07-21 RX ADMIN — ROCURONIUM BROMIDE 45 MG: 10 INJECTION, SOLUTION INTRAVENOUS at 01:07

## 2022-07-21 RX ADMIN — PROPOFOL 150 MG: 10 INJECTION, EMULSION INTRAVENOUS at 01:07

## 2022-07-21 RX ADMIN — SUCCINYLCHOLINE CHLORIDE 160 MG: 20 INJECTION, SOLUTION INTRAMUSCULAR; INTRAVENOUS; PARENTERAL at 01:07

## 2022-07-21 RX ADMIN — GLYCOPYRROLATE 0.6 MG: 0.2 INJECTION, SOLUTION INTRAMUSCULAR; INTRAVENOUS at 02:07

## 2022-07-21 RX ADMIN — FENTANYL CITRATE 50 MCG: 50 INJECTION, SOLUTION INTRAMUSCULAR; INTRAVENOUS at 02:07

## 2022-07-21 RX ADMIN — EPHEDRINE SULFATE 10 MG: 50 INJECTION, SOLUTION INTRAMUSCULAR; INTRAVENOUS; SUBCUTANEOUS at 02:07

## 2022-07-21 RX ADMIN — ROCURONIUM BROMIDE 5 MG: 10 INJECTION, SOLUTION INTRAVENOUS at 01:07

## 2022-07-21 RX ADMIN — OXYCODONE 5 MG: 5 TABLET ORAL at 03:07

## 2022-07-21 RX ADMIN — NEOSTIGMINE METHYLSULFATE 5 MG: 1 INJECTION INTRAVENOUS at 02:07

## 2022-07-21 RX ADMIN — SODIUM CHLORIDE, SODIUM LACTATE, POTASSIUM CHLORIDE, AND CALCIUM CHLORIDE: .6; .31; .03; .02 INJECTION, SOLUTION INTRAVENOUS at 02:07

## 2022-07-21 RX ADMIN — DEXAMETHASONE SODIUM PHOSPHATE 4 MG: 4 INJECTION, SOLUTION INTRA-ARTICULAR; INTRALESIONAL; INTRAMUSCULAR; INTRAVENOUS; SOFT TISSUE at 01:07

## 2022-07-21 RX ADMIN — ONDANSETRON 8 MG: 2 INJECTION INTRAMUSCULAR; INTRAVENOUS at 01:07

## 2022-07-21 RX ADMIN — SODIUM CHLORIDE, SODIUM LACTATE, POTASSIUM CHLORIDE, AND CALCIUM CHLORIDE: .6; .31; .03; .02 INJECTION, SOLUTION INTRAVENOUS at 12:07

## 2022-07-21 RX ADMIN — FENTANYL CITRATE 50 MCG: 50 INJECTION, SOLUTION INTRAMUSCULAR; INTRAVENOUS at 01:07

## 2022-07-21 RX ADMIN — MIDAZOLAM 2 MG: 1 INJECTION INTRAMUSCULAR; INTRAVENOUS at 01:07

## 2022-07-21 NOTE — PLAN OF CARE
Report to [facility]. Patient denies pain, no nausea, stent to right ureter, urine 150cc bloody, vs stable resting quietly, friend to be called for pickup

## 2022-07-21 NOTE — DISCHARGE INSTRUCTIONS
"Discharge Instructions: After Your Surgery/Procedure  Youve just had surgery. During surgery you were given medicine called anesthesia to keep you relaxed and free of pain. After surgery you may have some pain or nausea. This is common. Here are some tips for feeling better and getting well after surgery.     Stay on schedule with your medication.   Going home  Your doctor or nurse will show you how to take care of yourself when you go home. He or she will also answer your questions. Have an adult family member or friend drive you home.      For your safety we recommend these precaution for the first 24 hours after your procedure:  Do not drive or use heavy equipment.  Do not make important decisions or sign legal papers.  Do not drink alcohol.  Have someone stay with you, if needed. He or she can watch for problems and help keep you safe.  Your concentration, balance, coordination, and judgement may be impaired for many hours after anesthesia.  Use caution when ambulating or standing up.     You may feel weak and "washed out" after anesthesia and surgery.      Subtle residual effects of general anesthesia or sedation with regional / local anesthesia can last more than 24 hours.  Rest for the remainder of the day or longer if your Doctor/Surgeon has advised you to do so.  Although you may feel normal within the first 24 hours, your reflexes and mental ability may be impaired without you realizing it.  You may feel dizzy, lightheaded or sleepy for 24 hours or longer.      Be sure to go to all follow-up visits with your doctor. And rest after your surgery for as long as your doctor tells you to.  Coping with pain  If you have pain after surgery, pain medicine will help you feel better. Take it as told, before pain becomes severe. Also, ask your doctor or pharmacist about other ways to control pain. This might be with heat, ice, or relaxation. And follow any other instructions your surgeon or nurse gives you.  Tips " for taking pain medicine  To get the best relief possible, remember these points:  Pain medicines can upset your stomach. Taking them with a little food may help.  Most pain relievers taken by mouth need at least 20 to 30 minutes to start to work.  Taking medicine on a schedule can help you remember to take it. Try to time your medicine so that you can take it before starting an activity. This might be before you get dressed, go for a walk, or sit down for dinner.  Constipation is a common side effect of pain medicines. Call your doctor before taking any medicines such as laxatives or stool softeners to help ease constipation. Also ask if you should skip any foods. Drinking lots of fluids and eating foods such as fruits and vegetables that are high in fiber can also help. Remember, do not take laxatives unless your surgeon has prescribed them.  Drinking alcohol and taking pain medicine can cause dizziness and slow your breathing. It can even be deadly. Do not drink alcohol while taking pain medicine.  Pain medicine can make you react more slowly to things. Do not drive or run machinery while taking pain medicine.  Your health care provider may tell you to take acetaminophen to help ease your pain. Ask him or her how much you are supposed to take each day. Acetaminophen or other pain relievers may interact with your prescription medicines or other over-the-counter (OTC) drugs. Some prescription medicines have acetaminophen and other ingredients. Using both prescription and OTC acetaminophen for pain can cause you to overdose. Read the labels on your OTC medicines with care. This will help you to clearly know the list of ingredients, how much to take, and any warnings. It may also help you not take too much acetaminophen. If you have questions or do not understand the information, ask your pharmacist or health care provider to explain it to you before you take the OTC medicine.  Managing nausea  Some people have an  upset stomach after surgery. This is often because of anesthesia, pain, or pain medicine, or the stress of surgery. These tips will help you handle nausea and eat healthy foods as you get better. If you were on a special food plan before surgery, ask your doctor if you should follow it while you get better. These tips may help:  Do not push yourself to eat. Your body will tell you when to eat and how much.  Start off with clear liquids and soup. They are easier to digest.  Next try semi-solid foods, such as mashed potatoes, applesauce, and gelatin, as you feel ready.  Slowly move to solid foods. Dont eat fatty, rich, or spicy foods at first.  Do not force yourself to have 3 large meals a day. Instead eat smaller amounts more often.  Take pain medicines with a small amount of solid food, such as crackers or toast, to avoid nausea.     Call your surgeon if  You still have pain an hour after taking medicine. The medicine may not be strong enough.  You feel too sleepy, dizzy, or groggy. The medicine may be too strong.  You have side effects like nausea, vomiting, or skin changes, such as rash, itching, or hives.       If you have obstructive sleep apnea  You were given anesthesia medicine during surgery to keep you comfortable and free of pain. After surgery, you may have more apnea spells because of this medicine and other medicines you were given. The spells may last longer than usual.   At home:  Keep using the continuous positive airway pressure (CPAP) device when you sleep. Unless your health care provider tells you not to, use it when you sleep, day or night. CPAP is a common device used to treat obstructive sleep apnea.  Talk with your provider before taking any pain medicine, muscle relaxants, or sedatives. Your provider will tell you about the possible dangers of taking these medicines.  © 0810-5801 [employer]. [address] [place] [address], [place] [address]. All rights reserved. This information is  not intended as a substitute for professional medical care. Always follow your healthcare professional's instructions.   Post op instructions for prevention of DVT  What is deep vein thrombosis?  Deep vein thrombosis (DVT) is the medical term for blood clots in the deep veins of the leg.  These blood clots can be dangerous.  A DVT can block a blood vessel and keep blood from getting where it needs to go.  Another problem is that the clot can travel to other parts of the body such as the lungs.  A clot that travels to the lungs is called a pulmonary embolus (PE) and can cause serious problems with breathing which can lead to death.  Am I at risk for DVT/PE?  If you are not very active, you are at risk of DVT.  Anyone confined to bed, sitting for long periods of time, recovering from surgery, etc. increases the risk of DVT.  Other risk factors are cancer diagnosis, certain medications, estrogen replacement in any form,older age, obesity, pregnancy, smoking, history of clotting disorders, and dehydration.  How will I know if I have a DVT?  Swelling in the lower leg  Pain  Warmth, redness, hardness or bulging of the vein  If you have any of these symptoms, call your doctors office right away.  Some people will not have any symptoms until the clot moves to the lungs.  What are the symptoms of a PE?  Panting, shortness of breath, or trouble breathing  Sharp, knife-like chest pain when you breathe  Coughing or coughing up blood  Rapid heartbeat  If you have any of these symptoms or get worse quickly, call 911 for emergency treatment.  How can I prevent a DVT?  Avoid long periods of inactivity and dont cross your legs--get up and walk around every hour or so.  Stay active--walking after surgery is highly encouraged.  This means you should get out of the house and walk in the neighborhood.  Going up and down stairs will not impair healing (unless advised against such activity by your doctor).    Drink plenty of  noncaffeinated, nonalcoholic fluids each day to prevent dehydration.  Wear special support stockings, if they have been advised by your doctor.  If you travel, stop at least once an hour and walk around.  Avoid smoking (assistance with stopping is available through your healthcare provider)  Always notify your doctor if you are not able to follow the post operative instructions that are given to you at the time of discharge.  It may be necessary to prescribe one of the medications available to prevent DVT.

## 2022-07-21 NOTE — TRANSFER OF CARE
"Anesthesia Transfer of Care Note    Patient: Rory Cardona    Procedure(s) Performed: Procedure(s) (LRB):  REMOVAL, CALCULUS, URETER, URETEROSCOPIC (N/A)    Patient location: PACU    Anesthesia Type: general    Transport from OR: Transported from OR on room air with adequate spontaneous ventilation    Post pain: adequate analgesia    Post assessment: no apparent anesthetic complications    Post vital signs: stable    Level of consciousness: awake    Nausea/Vomiting: no nausea/vomiting    Complications: none    Transfer of care protocol was followed      Last vitals:   Visit Vitals  /60 (BP Location: Left arm, Patient Position: Lying)   Pulse (!) 54   Temp 36.8 °C (98.2 °F) (Skin)   Resp 18   Ht 5' 11" (1.803 m)   Wt 76.2 kg (168 lb)   SpO2 100%   BMI 23.43 kg/m²     "

## 2022-07-21 NOTE — ANESTHESIA PREPROCEDURE EVALUATION
07/21/2022  Rory Cardona is a 66 y.o., male.      Pre-op Assessment    I have reviewed the Patient Summary Reports.     I have reviewed the Nursing Notes. I have reviewed the NPO Status.   I have reviewed the Medications.     Review of Systems  Anesthesia Hx:  No problems with previous Anesthesia Denies Hx of Anesthetic complications    Social:  Non-Smoker    Cardiovascular:   Denies Hypertension.  Denies MI.  Denies CAD.    Denies CABG/stent.   Denies Angina.    Pulmonary:   Denies COPD.  Denies Asthma.  Denies Recent URI.    Renal/:   Denies Chronic Renal Disease.     Hepatic/GI:   Denies GERD. Denies Liver Disease.    Neurological:   Denies TIA. Denies CVA. Denies Seizures.    Endocrine:   Denies Diabetes. Denies Hypothyroidism.    Psych:   Denies Psychiatric History.          Physical Exam  General: Well nourished, Cooperative, Alert and Oriented    Airway:  Mallampati: II / II  Mouth Opening: Normal  TM Distance: 4 - 6 cm  Tongue: Normal    Dental:  Intact    Chest/Lungs:  Clear to auscultation, Normal Respiratory Rate    Heart:  Rate: Normal  Rhythm: Regular Rhythm  Sounds: Normal        Anesthesia Plan  Type of Anesthesia, risks & benefits discussed:    Anesthesia Type: Gen Natural Airway  Intra-op Monitoring Plan: Standard ASA Monitors  Induction:  IV  Informed Consent: Informed consent signed with the Patient and all parties understand the risks and agree with anesthesia plan.  All questions answered.   ASA Score: 2    Ready For Surgery From Anesthesia Perspective.     .

## 2022-07-21 NOTE — ANESTHESIA POSTPROCEDURE EVALUATION
Anesthesia Post Evaluation    Patient: Rory Cardona    Procedure(s) Performed: Procedure(s) (LRB):  REMOVAL, CALCULUS, URETER, URETEROSCOPIC (N/A)    Final Anesthesia Type: general      Patient location during evaluation: PACU  Patient participation: Yes- Able to Participate  Level of consciousness: awake and alert and oriented  Post-procedure vital signs: reviewed and stable  Pain management: adequate  Airway patency: patent    PONV status at discharge: No PONV  Anesthetic complications: no      Cardiovascular status: blood pressure returned to baseline  Respiratory status: unassisted, spontaneous ventilation and room air  Hydration status: euvolemic  Follow-up not needed.          Vitals Value Taken Time   /53 07/21/22 1510   Temp 36.6 °C (97.9 °F) 07/21/22 1455   Pulse 50 07/21/22 1512   Resp 13 07/21/22 1512   SpO2 97 % 07/21/22 1512   Vitals shown include unvalidated device data.      No case tracking events are documented in the log.      Pain/Dede Score: Pain Rating Prior to Med Admin: 0 (7/21/2022  2:55 PM)  Dede Score: 8 (7/21/2022  2:55 PM)

## 2022-07-21 NOTE — ANESTHESIA PROCEDURE NOTES
Intubation    Date/Time: 7/21/2022 1:33 PM  Performed by: Vashti Ernandez CRNA  Authorized by: Anthony Gallardo MD     Intubation:     Induction:  Intravenous    Intubated:  Postinduction    Mask Ventilation:  Easy mask    Attempts:  1    Attempted By:  CRNA    Method of Intubation:  Video laryngoscopy    Blade:  Herron 2    Laryngeal View Grade: Grade I - full view of cords      Difficult Airway Encountered?: No      Complications:  None    Airway Device:  Oral endotracheal tube    Airway Device Size:  8.0    Style/Cuff Inflation:  Cuffed (inflated to minimal occlusive pressure)    Inflation Amount (mL):  5    Tube secured:  21    Secured at:  The lips    Placement Verified By:  Capnometry and Revisualization with laryngoscopy    Complicating Factors:  None    Findings Post-Intubation:  BS equal bilateral and atraumatic/condition of teeth unchanged

## 2022-07-21 NOTE — INTERVAL H&P NOTE
The patient has been examined and the H&P has been reviewed:    Stones still in distal right ureter on xray  Proceed as planned with stone extraction    There are no hospital problems to display for this patient.

## 2022-07-21 NOTE — PLAN OF CARE
Pt ambulated in room and voided clear pink urine without difficulty.  Denies pain or nausea, states that he is ready for discharge

## 2022-07-21 NOTE — OP NOTE
[facility] Urology Operative Report     Date: 07/21/2022     Staff Surgeon: Rory Hernandez MD     Pre-Op Diagnosis: Right ureteral calculi     Post-Op Diagnosis:  Same     Procedure(s) Performed:   Right ureteroscopy with holmium laser lithotripsy and stone basket extraction  Cystoscopy with placement of right double-J ureteral stent, 6 Lao by 24 cm  Right retrograde pyelogram including injection of contrast  Fluoroscopy less than 1 hour including interpretation of fluoroscopic images     Specimen(s):  Stone for chemical analysis     Anesthesia: General LMA anesthesia     Findings:  2 large distal ureteral stones, total stone burden >1cm, mildly impacted with narrow distal ureter, ultimately accessed and lithotripsy and basket extraction performed of all distal ureteral stone burden     Estimated Blood Loss:  Minimal     Drains:  Ureteral stent     Complications:  None     Indications for procedure:  66-year-old male with recent ER visit for right flank pain which yielded finding of 2 large distal ureteral stones. Remained asymptomatic since ER and obstruction resolved though stones persisted in distal ureter. Given stone burden failure of trial of passage, elected to proceed with definitive ureteroscopic stone extraction.  All risks and benefits discussed and appropriate informed consent was obtained.     Procedure in detail:  After appropriate informed consent was obtained, the patient was taken to the operating room and placed in the lithotomy position.  He was prepped and draped in standard sterile fashion and preoperative antibiotics were administered.  WHO approved time-out was performed.     On fluoroscopy, stones seen radiopaque in the distal ureter inside the pelvic inlet. Rigid Olympus cystoscope was passed into the bladder via the urethra and a Cook motion guidewire was passed into the ureter and seen to curl proximally.  A semi rigid ureteroscope was passed alongside the guidewire into the ureter,  though only into very distal orifice as would not pass and attempt to pass super stiff wire through unsuccessful given angle narrowing and wire attempting submucosal passage so 10fr dual lumen placed over wire and superstiff wouldn't pass stones, so 2nd motion wire passed, over which 5fr passed stones and to collecting system to exchange for super stiff. Attempt at passage of semirigid ureteroscope between wires after 10fr dilation and 2 wires unsuccessful so 12-14fr 35  access sheath passed under flouroscopy to level of stones and inner stylet discontinued and flexible ureteroscope passed in but access sheath immediately backed out to bladder given distal location of stones. Now that 12-14fr sheath placed, additional attempt at semirigid scope passage able to pass between wires and to level of stones.  Three hundred sixty-five micron holmium laser fiber was used to perform holmium laser lithotripsy, initially dusting stones then fragmenting into smaller pieces amenable to basket extraction.    Once serial basket extraction was performed, and the scope was passed proximal to the point of lithotripsy, and no further significant stone fragments, and only debris and dust as scope withdrawn in a systematic fashion keeping the ureter in a 360 degree view all the way down, and at the point of impaction and significant lithotripsy, there was some blanching of ureteral mucosa circumferentially. Dilute Isovue contrast injected in retrograde fashion through scope noting no further filling defect in distal ureter and smooth filling to nondilated collecting system and confirmed wire in place in upper pole. With all stones removed, and ureter intact, and guidewire confirmed with proximal curl collecting system, the ureteroscope removed, the guidewire was backloaded through the rigid cystoscope.     Attempt at a 7 Lao by 28 cm double-J ureteral stent to be passed over the guidewire using direct vision cystoscopically at the  ureteral orifice, and proximal guidance fluoroscopically, though too long with 2 curls proximally, so grasped and exchanged for a 7 French by 24 m double-J ureteral stent which was placed confirming proximal curl on flouroscopy and cystoscope for visual distal curl confirmation of stent within the bladder. He tolerated the procedure well, there were no complications.  He was awakened taken to PACU without incident.     Disposition:  home today status post uncomplicated procedure as above.  No string was left on the stent given need for dwell time of approximately 3 weeks given significant impaction stone and ureteral mucosal blanching as well as distal orifice narrowing and manipulation, as needs time to heal.  Continue Flomax until then.  Follow up:  8/9/22 stent removal at [facility]  Instructions: Drink plenty of water.  Will see blood in urine.  Increased water and decrease activity for more blood in urine.  Hold any aspirin, fish oil, blood thinners x 3-5d.  Continue Flomax until stent is removed.  Complete 5 days of antibiotics    Discharge home today status post uncomplicated procedure as above  Diet - resume home diet  Follow up:  8/9/22 stent removal at [facility], with preprocedure COVID 3 days prior  Instructions: Drink plenty of water.  Will see blood in urine.  Increased water and decrease activity for more blood in urine.  Hold any aspirin, fish oil, blood thinners.  Continue Flomax until stent is removed.  Complete 5 days of antibiotics.  Meds:     Medication List      START taking these medications    sulfamethoxazole-trimethoprim 800-160mg 800-160 mg Tab  Commonly known as: BACTRIM DS  Take 1 tablet by mouth 2 (two) times daily. for 5 days        CONTINUE taking these medications    HYDROcodone-acetaminophen 7.5-325 mg per tablet  Commonly known as: NORCO  Take 1 tablet by mouth every 6 (six) hours as needed for Pain.     ibuprofen 600 MG tablet  Commonly known as: ADVIL,MOTRIN  Take 1 tablet (600 mg total) by  mouth every 6 (six) hours as needed for Pain.     ondansetron 4 MG Tbdl  Commonly known as: ZOFRAN-ODT  Take 1 tablet (4 mg total) by mouth every 8 (eight) hours as needed.     tamsulosin 0.4 mg Cap  Commonly known as: FLOMAX  Take 1 capsule (0.4 mg total) by mouth once daily.           Where to Get Your Medications      These medications were sent to [id number] - [place] [address] AT [facility] [address] [place] [address]    Phone: [phone]   · sulfamethoxazole-trimethoprim 800-160mg 800-160 mg Tab

## 2022-07-22 VITALS
HEART RATE: 57 BPM | RESPIRATION RATE: 18 BRPM | TEMPERATURE: 98 F | SYSTOLIC BLOOD PRESSURE: 133 MMHG | WEIGHT: 168 LBS | OXYGEN SATURATION: 98 % | BODY MASS INDEX: 23.52 KG/M2 | DIASTOLIC BLOOD PRESSURE: 77 MMHG | HEIGHT: 71 IN

## 2022-07-22 NOTE — PROGRESS NOTES
Pt. States very pleased with care given, urine is clear guillermina aid colored, slight pain with urination, no clots.  RN reviewed all d/c instructions with patient who voiced understanding.

## 2022-07-27 LAB
COMPN STONE: NORMAL
SPECIMEN SOURCE: NORMAL
STONE ANALYSIS IR-IMP: NORMAL

## 2022-08-01 NOTE — TELEPHONE ENCOUNTER
----- Message from Destiny Santana sent at 8/1/2022  1:52 PM CDT -----  Contact: pt  Type:  RX Refill Request    Who Called:  pt  Refill or New Rx:  refill  RX Name and Strength:   losartan (COZAAR) 50 MG Tab  How is the patient currently taking it? (ex. 1XDay):  as ordered  Is this a 30 day or 90 day RX:   90  Preferred Pharmacy with phone number:        [facility] - [place], [place] - [address] [facility]  [address] [place] [address]  Phone: [phone] Fax: [phone]      Local or Mail Order:  local  Ordering Provider:  alysia Carcamo Call Back Number:  [phone]  Additional Information:   pt is currently out.        Pt is also asking for a f.u appt to be seen

## 2022-08-02 NOTE — DISCHARGE INSTRUCTIONS
Before leaving, please make sure you have all your personal belongings such as glasses, purses, wallets, keys, cell phones, jewelry, jackets etc           After the procedure    Drink plenty of fluids.  You may have burning or light bleeding when you urinate--this is normal.  Medications may be prescribed to ease any discomfort or prevent infection. Take these as directed.  Call your doctor if you have heavy bleeding or blood clots, burning that lasts more than a day, a fever over 100°F  (38° C), or trouble urinating.    After Surgery:  Always be aware that any surgery can cause these symptoms:    Pain- Medication can be prescribed for pain to decrease your pain but may not completely take your pain away.  Over the Counter pain medicine my be enough and you can always use Ice and rest to help ease pain.    Bleeding- a little bleeding after a surgery is usually within normal.  If there is a lot of blood you need to notify your MD.  Emergency treatments of bleeding are cold application, elevation of the bleeding site and compression.    Infection- Infection after surgery is NOT a normal occurrence.  Signs of infection are fever, swelling, hot to touch the incision.  If this occurs notify your MD immediately.    Nausea- this can be common after a surgery especially if you have had anesthesia medicine or are taking pain medicine.  Staying on clear liquids, bland foods, gingerale, or over the counter anti nausea medicines can help.  If you vomit more than once, notify your MD.  Anti Nausea medicines can be prescribed.

## 2022-08-06 ENCOUNTER — LAB VISIT (OUTPATIENT)
Dept: PRIMARY CARE CLINIC | Facility: CLINIC | Age: 67
End: 2022-08-06
Payer: MEDICARE

## 2022-08-06 DIAGNOSIS — N20.1 RIGHT URETERAL CALCULUS: ICD-10-CM

## 2022-08-06 PROCEDURE — U0005 INFEC AGEN DETEC AMPLI PROBE: HCPCS | Performed by: UROLOGY

## 2022-08-06 PROCEDURE — U0003 INFECTIOUS AGENT DETECTION BY NUCLEIC ACID (DNA OR RNA); SEVERE ACUTE RESPIRATORY SYNDROME CORONAVIRUS 2 (SARS-COV-2) (CORONAVIRUS DISEASE [COVID-19]), AMPLIFIED PROBE TECHNIQUE, MAKING USE OF HIGH THROUGHPUT TECHNOLOGIES AS DESCRIBED BY CMS-2020-01-R: HCPCS | Performed by: UROLOGY

## 2022-08-07 LAB — SARS-COV-2 RNA RESP QL NAA+PROBE: NOT DETECTED

## 2022-08-09 ENCOUNTER — HOSPITAL ENCOUNTER (OUTPATIENT)
Facility: AMBULARY SURGERY CENTER | Age: 67
Discharge: HOME | End: 2022-08-09
Attending: UROLOGY | Admitting: UROLOGY
Payer: MEDICARE

## 2022-08-09 DIAGNOSIS — N20.1 URETERAL CALCULUS, RIGHT: ICD-10-CM

## 2022-08-09 PROCEDURE — 52310 CYSTOSCOPY AND TREATMENT: CPT | Mod: ,,, | Performed by: UROLOGY

## 2022-08-09 PROCEDURE — 52310 PR CYSTOSCOPY,REMV CALCULUS,SIMPLE: ICD-10-PCS | Mod: ,,, | Performed by: UROLOGY

## 2022-08-09 PROCEDURE — 52310 CYSTOSCOPY AND TREATMENT: CPT | Performed by: UROLOGY

## 2022-08-09 RX ORDER — LIDOCAINE HYDROCHLORIDE 20 MG/ML
JELLY TOPICAL
Status: DISCONTINUED | OUTPATIENT
Start: 2022-08-09 | End: 2022-08-09 | Stop reason: HOSPADM

## 2022-08-09 RX ORDER — CIPROFLOXACIN 500 MG/1
500 TABLET ORAL 2 TIMES DAILY
Qty: 4 TABLET | Refills: 0 | Status: SHIPPED | OUTPATIENT
Start: 2022-08-09

## 2022-08-09 NOTE — H&P
[facility] Urology New Patient/H&P:      Rory Cardona is a 66 y.o. male who presents for ER follow up of right ureteral calculi     [place] ER 6/22/22: for R abdominal pain with nausea and vomiting.  He had a normal bowel movement.  He has been urinating normally.  His urine has been somewhat dark.  He denies history of kidney stone.  No history of bowel obstructions or diverticulitis.  No history of gallbladder disease or pancreatitis.  He denies fever but endorses chills. He states the pain waxes and wanes but is always present.  He describes it as a dull aching non localized pain.  Denies flank pain  Cr 1.3, eGFR 56.9, UA micro with 65 rbc o/w neg  CT: There are 2 adjacent stones within the right distal ureter, measuring 5 and 7 mm, respectively.  This results in mild upstream right hydroureteronephrosis.  Additional stones are present within both kidneys     He presents today noting  No known prior stone episodes.  Had one episode 30 years ago with some back pain after calling under house questionable spider bite he thinks it may have been a stone unknown.  Never diagnosed.  Personal review of CT scan notes these 2 adjacent stones in the distal ureter which on coronal, measure 1.2 cm and composite length, with the larger stone being more distal  Also has small 4-5mm LLP and 2 punctate LUP calcs, and small RLP calc, nonobst  Feels well today.  No significant flank pain.  Took a pain pill 1 day.  AUA SS 4/1 (1 freq, int, weak, sleep)  udip still lg blood             Past Medical History:   Diagnosis Date    Pressure sore of lower back                 Past Surgical History:   Procedure Laterality Date    CYST REMOVAL         on Butt         History reviewed. No pertinent family history.     Social History               Socioeconomic History    Marital status: Unknown   Tobacco Use    Smoking status: Never Smoker    Smokeless tobacco: Never Used   Substance and Sexual Activity    Alcohol use: Yes       Comment:  "socially    Drug use: Yes       Types: Marijuana       Comment: occ    Sexual activity: Not Currently            Review of patient's allergies indicates:  No Known Allergies     Medications Reviewed: see MAR     Focused Physical Exam         Vitals:     06/27/22 1151   BP: 111/71   Pulse: (!) 58   Resp: 18      Body mass index is 23.29 kg/m². Weight: 75.8 kg (167 lb) Height: 5' 11" (180.3 cm)         Abdomen: Soft, non-tender, nondistended, no CVA tenderness     LABS:     Recent Results         Recent Results (from the past 336 hour(s))   Urinalysis, Reflex to Urine Culture Urine, Clean Catch     Collection Time: 06/22/22  4:49 PM     Specimen: Urine   Result Value Ref Range     Specimen UA Urine, Unspecified       Color, UA Yellow Yellow, Straw, Gisela     Appearance, UA Clear Clear     pH, UA 8.0 5.0 - 8.0     Specific Gravity, UA 1.020 1.005 - 1.030     Protein, UA 1+ (A) Negative     Glucose, UA Negative Negative     Ketones, UA 2+ (A) Negative     Bilirubin (UA) Negative Negative     Occult Blood UA 3+ (A) Negative     Nitrite, UA Negative Negative     Urobilinogen, UA 1.0 Negative EU/dL     Leukocytes, UA Negative Negative   Urinalysis Microscopic     Collection Time: 06/22/22  4:49 PM   Result Value Ref Range     RBC, UA 65 (H) 0 - 4 /hpf     WBC, UA 0 0 - 5 /hpf     Bacteria Rare None-Occ /hpf     Squam Epithel, UA 0 /hpf     Hyaline Casts, UA 0 0-1/lpf /lpf     Microscopic Comment SEE COMMENT     CBC auto differential     Collection Time: 06/22/22  4:54 PM   Result Value Ref Range     WBC 10.92 3.90 - 12.70 K/uL     RBC 4.96 4.60 - 6.20 M/uL     Hemoglobin 15.0 14.0 - 18.0 g/dL     Hematocrit 43.9 40.0 - 54.0 %     MCV 89 82 - 98 fL     MCH 30.2 27.0 - 31.0 pg     MCHC 34.2 32.0 - 36.0 g/dL     RDW 12.9 11.5 - 14.5 %     Platelets 262 150 - 450 K/uL     MPV 9.9 9.2 - 12.9 fL     Immature Granulocytes 0.4 0.0 - 0.5 %     Gran # (ANC) 8.9 (H) 1.8 - 7.7 K/uL     Immature Grans (Abs) 0.04 0.00 - 0.04 K/uL     " Lymph # 1.3 1.0 - 4.8 K/uL     Mono # 0.5 0.3 - 1.0 K/uL     Eos # 0.1 0.0 - 0.5 K/uL     Baso # 0.04 0.00 - 0.20 K/uL     nRBC 0 0 /100 WBC     Gran % 81.8 (H) 38.0 - 73.0 %     Lymph % 11.9 (L) 18.0 - 48.0 %     Mono % 4.9 4.0 - 15.0 %     Eosinophil % 0.6 0.0 - 8.0 %     Basophil % 0.4 0.0 - 1.9 %     Differential Method Automated     Comprehensive metabolic panel     Collection Time: 06/22/22  4:54 PM   Result Value Ref Range     Sodium 143 136 - 145 mmol/L     Potassium 3.9 3.5 - 5.1 mmol/L     Chloride 107 95 - 110 mmol/L     CO2 24 23 - 29 mmol/L     Glucose 131 (H) 70 - 110 mg/dL     BUN 13 8 - 23 mg/dL     Creatinine 1.3 0.5 - 1.4 mg/dL     Calcium 9.4 8.7 - 10.5 mg/dL     Total Protein 7.5 6.0 - 8.4 g/dL     Albumin 4.3 3.5 - 5.2 g/dL     Total Bilirubin 0.7 0.1 - 1.0 mg/dL     Alkaline Phosphatase 63 55 - 135 U/L     AST 18 10 - 40 U/L     ALT 17 10 - 44 U/L     Anion Gap 12 8 - 16 mmol/L     eGFR if African American >60.0 >60 mL/min/1.73 m^2     eGFR if non  56.9 (A) >60 mL/min/1.73 m^2   Lipase     Collection Time: 06/22/22  4:54 PM   Result Value Ref Range     Lipase 39 4 - 60 U/L   POCT URINE DIPSTICK WITHOUT MICROSCOPE     Collection Time: 06/27/22 11:59 AM   Result Value Ref Range     Color, UA Yellow       pH, UA 6.5       WBC, UA neg       Nitrite, UA neg       Protein, POC neg       Glucose, UA neg       Ketones, UA neg       Urobilinogen, UA 0.2       Bilirubin, POC neg       Blood, UA large       Clarity, UA Clear       Spec Grav UA 1.020                 Assessment/Diagnosis:     1. Right ureteral calculus  POCT URINE DIPSTICK WITHOUT MICROSCOPE     Case Request Operating Room: REMOVAL, CALCULUS, URETER, URETEROSCOPIC     Place in Outpatient     Diet NPO     Basic metabolic panel     CBC auto differential     Protime-INR     Urinalysis     Urine culture     EKG 12-lead     X-Ray Chest PA And Lateral     X-Ray Abdomen AP 1 View     US Retroperitoneal Complete (Kidney and      COVID-19 Routine Screening   2. Ureteral stone  Ambulatory referral/consult to Urology     tamsulosin (FLOMAX) 0.4 mg Cap   3. Hydronephrosis, unspecified hydronephrosis type  Ambulatory referral/consult to Urology         Plans:  Extensively reviewed all labs, imaging, workup, notes from ER with presentation for right ureteral calculi.  Extensively reviewed CT imaging with patient denoting these to distal ureteral calculi, immediately adjacent to each other, with the larger 7 mm stone more distal, with a composite of length of about 12 mm within the distal ureter causing obstruction.  Urinalysis still demonstrates blood, and he is not provided a strainer but has not seen a stone pass.  We did demonstrate that is unlikely he will spontaneously pass both of the stones, and noted that a 5 mm stone in the kidney has 85% chance of spontaneous passage, thus higher rates with smaller size and more distal propagation.  The stones are the distal ureter, but of a composite are quite large size.  He was given Flomax, and he is relatively asymptomatic at this time and we did discuss initial trial of medical expulsive therapy by continuing Flomax, hydrating adequately, and straining all urine.  A stone strainer was provided.  However, noted that given the obstruction and size of stones, recommendation is to intervene at around 4 weeks from presentation to avoid further obstructive complications.  Reviewed options for intervention for stones such as lithotripsy and ureteroscopy, noting the formers on option with distal stone burden, and therefore described ureteroscopy with stone extraction laser lithotripsy in detail.  All questions answered and appropriate informed consent obtained and this was planned in the operating room on 7/28/22.    Rx for Flomax was extended.  Certainly if he should collect a stone, and definitely if he collects both stones, should notify us to send for chemical analysis and reimaging and cancel  procedure.  Should he not pass both stones prior to the procedure, will proceed as planned.  Also included discussion about stone prevention recommendations which were provided in writing which also help facilitate the passage of small nonobstructing stones.  ER return precautions were also discussed and provided in writing.  As well, at time of pre-admit testing visit 2 weeks prior, will get KUB and renal ultrasound for evaluation of any further distal propagation, presence of stones, or continued or worsening obstruction      7/21/22 URS of 2 distal stones  Here for stent removal  Acceptable for asc

## 2022-08-10 VITALS
DIASTOLIC BLOOD PRESSURE: 79 MMHG | RESPIRATION RATE: 18 BRPM | SYSTOLIC BLOOD PRESSURE: 153 MMHG | BODY MASS INDEX: 23.43 KG/M2 | OXYGEN SATURATION: 98 % | WEIGHT: 168 LBS | HEART RATE: 51 BPM | TEMPERATURE: 98 F

## 2022-08-10 NOTE — OP NOTE
[facility] Operative/Brief Discharge Note     Date: 8/9/2022     Staff Surgeon: Rory Hernandez MD     Pre-Op Diagnosis: right indwelling urteral stent     Post-Op Diagnosis: same     Procedure(s) Performed:   Cystoscopy with right ureteral stent removal      Specimen(s): none      Anesthesia: local urojet      Findings: ureteral stent, removed      Estimated Blood Loss: none      Drains: none      Complications: none      Indications for procedure:   66 year old male status post R URS/HLL 7/21/22 of 2 impacted distal ureteral stones     Procedure in detail:  After informed consent, the patient was placed dorsal lithotomy position and prepped and drapped in standard cystoscopic fashion and 2% xylocaine jelly was instilled into the urethra.  A flexible cystoscope was passed into the bladder via the urethra.     Urethra appeared normal, with moderate lateral lobe enlargement and ingrowth of prostate, and bilateral ureteral orifices in orthotpic position with the distal curl of the stent seen emanating from right ureteral orifice. No calcifications on distal curl of stent.  2-pronged grasper was passed through the scope and used to grasp and remove the stent. Stent was inspected on the back table and found to be completely in tact. Patient tolerated procedure well. There were no complications.       Disposition: home.   Stone prevention recs provided again. 100% CaOx mono stone  FU NP 3 mos with 24hr urine as well as US to ro silent obstruction, and reeval luts/discuss stone prevention  Though first stone episode, significant complex stone burden so advised metab workup at min with 24h urine.   Complete 2d abx  Can DC flomax as no noted preop LUTS but will reeval LUTS at fu visit given enlarged prostate  If doing well at NP visit, can set 9 mos f.u with me for his annual (1yr from now) with psa prior       Discharge home today status post uncomplicated procedure as above  Diet - stone prevention  Follow up:  as  above - BRIDGETTE and 24hr urine and NP in 3 mos for stone prevention/reeval LUTS, 1 yr with MD with psa prior  Instructions:   Drink lots of water, can stop flomax, follow stone prevention recs as provided on avs, may have blood or burning for 24-48 hours, complete abx  Meds:     Medication List      START taking these medications    ciprofloxacin HCl 500 MG tablet  Commonly known as: CIPRO  Take 1 tablet (500 mg total) by mouth 2 (two) times daily.        STOP taking these medications    HYDROcodone-acetaminophen 7.5-325 mg per tablet  Commonly known as: NORCO     ibuprofen 600 MG tablet  Commonly known as: ADVIL,MOTRIN     ondansetron 4 MG Tbdl  Commonly known as: ZOFRAN-ODT     tamsulosin 0.4 mg Cap  Commonly known as: FLOMAX           Where to Get Your Medications      These medications were sent to [id number] - [place], [place] [address] [address]  [address] [place] [address]    Phone: [phone]   · ciprofloxacin HCl 500 MG tablet

## 2023-04-20 ENCOUNTER — LAB VISIT (OUTPATIENT)
Dept: LAB | Facility: HOSPITAL | Age: 68
End: 2023-04-20
Attending: INTERNAL MEDICINE
Payer: MEDICARE

## 2023-04-20 DIAGNOSIS — R13.10 PROBLEMS WITH SWALLOWING AND MASTICATION: Primary | ICD-10-CM

## 2023-04-20 LAB
ALBUMIN SERPL BCP-MCNC: 3.6 G/DL (ref 3.5–5.2)
ALP SERPL-CCNC: 50 U/L (ref 55–135)
ALT SERPL W/O P-5'-P-CCNC: 18 U/L (ref 10–44)
ANION GAP SERPL CALC-SCNC: 10 MMOL/L (ref 8–16)
AST SERPL-CCNC: 19 U/L (ref 10–40)
BASOPHILS # BLD AUTO: 0.04 K/UL (ref 0–0.2)
BASOPHILS NFR BLD: 0.6 % (ref 0–1.9)
BILIRUB SERPL-MCNC: 0.6 MG/DL (ref 0.1–1)
BUN SERPL-MCNC: 21 MG/DL (ref 8–23)
CALCIUM SERPL-MCNC: 9 MG/DL (ref 8.7–10.5)
CHLORIDE SERPL-SCNC: 106 MMOL/L (ref 95–110)
CO2 SERPL-SCNC: 27 MMOL/L (ref 23–29)
CREAT SERPL-MCNC: 0.9 MG/DL (ref 0.5–1.4)
CRP SERPL-MCNC: 1.19 MG/DL
DIFFERENTIAL METHOD: NORMAL
EOSINOPHIL # BLD AUTO: 0.3 K/UL (ref 0–0.5)
EOSINOPHIL NFR BLD: 4.1 % (ref 0–8)
ERYTHROCYTE [DISTWIDTH] IN BLOOD BY AUTOMATED COUNT: 14.1 % (ref 11.5–14.5)
EST. GFR  (NO RACE VARIABLE): >60 ML/MIN/1.73 M^2
GLUCOSE SERPL-MCNC: 114 MG/DL (ref 70–110)
HCT VFR BLD AUTO: 43.3 % (ref 40–54)
HGB BLD-MCNC: 14.1 G/DL (ref 14–18)
IMM GRANULOCYTES # BLD AUTO: 0.01 K/UL (ref 0–0.04)
IMM GRANULOCYTES NFR BLD AUTO: 0.1 % (ref 0–0.5)
LYMPHOCYTES # BLD AUTO: 2.4 K/UL (ref 1–4.8)
LYMPHOCYTES NFR BLD: 36 % (ref 18–48)
MCH RBC QN AUTO: 29.7 PG (ref 27–31)
MCHC RBC AUTO-ENTMCNC: 32.6 G/DL (ref 32–36)
MCV RBC AUTO: 91 FL (ref 82–98)
MONOCYTES # BLD AUTO: 0.7 K/UL (ref 0.3–1)
MONOCYTES NFR BLD: 10.8 % (ref 4–15)
NEUTROPHILS # BLD AUTO: 3.3 K/UL (ref 1.8–7.7)
NEUTROPHILS NFR BLD: 48.4 % (ref 38–73)
NRBC BLD-RTO: 0 /100 WBC
PLATELET # BLD AUTO: 247 K/UL (ref 150–450)
PMV BLD AUTO: 9.9 FL (ref 9.2–12.9)
POTASSIUM SERPL-SCNC: 4.4 MMOL/L (ref 3.5–5.1)
PROT SERPL-MCNC: 7 G/DL (ref 6–8.4)
RBC # BLD AUTO: 4.75 M/UL (ref 4.6–6.2)
SODIUM SERPL-SCNC: 143 MMOL/L (ref 136–145)
WBC # BLD AUTO: 6.77 K/UL (ref 3.9–12.7)

## 2023-04-20 PROCEDURE — 36415 COLL VENOUS BLD VENIPUNCTURE: CPT | Performed by: INTERNAL MEDICINE

## 2023-04-20 PROCEDURE — 85025 COMPLETE CBC W/AUTO DIFF WBC: CPT | Performed by: INTERNAL MEDICINE

## 2023-04-20 PROCEDURE — 80053 COMPREHEN METABOLIC PANEL: CPT | Performed by: INTERNAL MEDICINE

## 2023-04-20 PROCEDURE — 86140 C-REACTIVE PROTEIN: CPT | Performed by: INTERNAL MEDICINE

## 2023-06-23 ENCOUNTER — HOSPITAL ENCOUNTER (EMERGENCY)
Facility: HOSPITAL | Age: 68
Discharge: HOME | End: 2023-06-23
Attending: EMERGENCY MEDICINE
Payer: MEDICARE

## 2023-06-23 VITALS
OXYGEN SATURATION: 98 % | TEMPERATURE: 98 F | SYSTOLIC BLOOD PRESSURE: 155 MMHG | RESPIRATION RATE: 16 BRPM | WEIGHT: 170 LBS | HEART RATE: 72 BPM | BODY MASS INDEX: 25.76 KG/M2 | HEIGHT: 68 IN | DIASTOLIC BLOOD PRESSURE: 72 MMHG

## 2023-06-23 DIAGNOSIS — S46.812A STRAIN OF LEFT TRAPEZIUS MUSCLE, INITIAL ENCOUNTER: Primary | ICD-10-CM

## 2023-06-23 PROCEDURE — 96372 THER/PROPH/DIAG INJ SC/IM: CPT | Performed by: EMERGENCY MEDICINE

## 2023-06-23 PROCEDURE — 99284 EMERGENCY DEPT VISIT MOD MDM: CPT

## 2023-06-23 PROCEDURE — 63600175 PHARM REV CODE 636 W HCPCS: Performed by: EMERGENCY MEDICINE

## 2023-06-23 PROCEDURE — 25000003 PHARM REV CODE 250: Performed by: EMERGENCY MEDICINE

## 2023-06-23 RX ORDER — HYDROCODONE BITARTRATE AND ACETAMINOPHEN 5; 325 MG/1; MG/1
1 TABLET ORAL
Status: COMPLETED | OUTPATIENT
Start: 2023-06-23 | End: 2023-06-23

## 2023-06-23 RX ORDER — HYDROCODONE BITARTRATE AND ACETAMINOPHEN 5; 325 MG/1; MG/1
1 TABLET ORAL EVERY 6 HOURS PRN
Qty: 12 TABLET | Refills: 0 | Status: SHIPPED | OUTPATIENT
Start: 2023-06-23 | End: 2024-02-07

## 2023-06-23 RX ORDER — DEXAMETHASONE SODIUM PHOSPHATE 4 MG/ML
8 INJECTION, SOLUTION INTRA-ARTICULAR; INTRALESIONAL; INTRAMUSCULAR; INTRAVENOUS; SOFT TISSUE
Status: COMPLETED | OUTPATIENT
Start: 2023-06-23 | End: 2023-06-23

## 2023-06-23 RX ORDER — METHOCARBAMOL 500 MG/1
1000 TABLET, FILM COATED ORAL 3 TIMES DAILY PRN
Qty: 30 TABLET | Refills: 0 | Status: SHIPPED | OUTPATIENT
Start: 2023-06-23 | End: 2023-06-28

## 2023-06-23 RX ADMIN — DEXAMETHASONE SODIUM PHOSPHATE 8 MG: 4 INJECTION, SOLUTION INTRA-ARTICULAR; INTRALESIONAL; INTRAMUSCULAR; INTRAVENOUS; SOFT TISSUE at 07:06

## 2023-06-23 RX ADMIN — HYDROCODONE BITARTRATE AND ACETAMINOPHEN 1 TABLET: 5; 325 TABLET ORAL at 07:06

## 2023-06-23 NOTE — ED PROVIDER NOTES
Encounter Date: 6/23/2023       History     Chief Complaint   Patient presents with    Shoulder Pain     Pt has a knot on the top of his left shoulder and the pain goes down to the elbow.      Patient reports left shoulder and arm pain for approximally 3 weeks.  Symptoms began after golfing.  Pain is worse with movement and activity left arm.  Pain is mostly relieved at nighttime.  There is no arm weakness.  No chest pain shortness of breath.  No direct injury.  Patient with a history of previous shoulder surgery.    Review of patient's allergies indicates:   Allergen Reactions    Tetracyclines Hives     Past Medical History:   Diagnosis Date    Depression with anxiety     no longer needs treatment    Neuritis     Peroneal tendinitis     Plantar fasciitis, left     Sciatic nerve pain     Stiffness of right shoulder joint     Trigger finger of left hand 2018    Ulcerative colitis      Past Surgical History:   Procedure Laterality Date    ANKLE LIGAMENT RECONSTRUCTION Left 03/13/2008    bilateral     SHOULDER ARTHROSCOPY W/ LABRAL REPAIR Right 02/08/2017    TRIGGER FINGER RELEASE Left 6/2/2020    Procedure: RELEASE, TRIGGER FINGER;  Surgeon: Demetrius Baez Jr., MD;  Location: [facility];  Service: Orthopedics;  Laterality: Left;    WRIST FUSION Right 01/17/2011    1980 also right wrist fusion     Family History   Problem Relation Age of Onset    Hypertension Father     Heart disease Father      Social History     Tobacco Use    Smoking status: Light Smoker     Types: Pipe    Smokeless tobacco: Never   Substance Use Topics    Alcohol use: Yes     Comment: rarely    Drug use: No     Review of Systems   Constitutional:  Negative for chills and fever.   HENT:  Negative for sore throat.    Eyes:  Negative for photophobia and visual disturbance.   Respiratory:  Negative for shortness of breath.    Cardiovascular:  Negative for chest pain.   Gastrointestinal:  Negative for abdominal pain and vomiting.   Genitourinary:   Negative for dysuria.   Musculoskeletal:  Negative for joint swelling.   Skin:  Negative for rash.   Neurological:  Negative for weakness and headaches.   Psychiatric/Behavioral:  Negative for confusion.      Physical Exam     Initial Vitals   BP Pulse Resp Temp SpO2   06/23/23 0704 06/23/23 0704 06/23/23 0706 06/23/23 0705 06/23/23 0704   (!) 198/90 79 17 98.1 °F (36.7 °C) 96 %      MAP       --                Physical Exam    Nursing note and vitals reviewed.  Constitutional: He is not diaphoretic. No distress.   HENT:   Head: Normocephalic and atraumatic.   Eyes: Conjunctivae are normal.   Neck:   Normal range of motion.  Cardiovascular:  Regular rhythm.           Pulmonary/Chest: Breath sounds normal.   Abdominal: Abdomen is soft. There is no abdominal tenderness.   Musculoskeletal:      Cervical back: Normal range of motion.      Comments: Good strength and sensation both arms.  2+ radial pulse on left.  No left arm swelling.  Full range of motion left shoulder.  No pain with passive movement of left shoulder.  There is palpable spasm of left trapezius.  Palpation reproduces pain exactly.     Skin: No rash noted.   Psychiatric: He has a normal mood and affect.       ED Course   Procedures  Labs Reviewed - No data to display       Imaging Results    None          Medications   dexAMETHasone injection 8 mg (8 mg Intramuscular Given 6/23/23 0720)   HYDROcodone-acetaminophen 5-325 mg per tablet 1 tablet (1 tablet Oral Given 6/23/23 0719)     Medical Decision Making:   History:   Old Medical Records: I decided to obtain old medical records.  ED Management:  Patient presents with left trapezius spasm.  There is no bony abnormalities.  No cardiac symptoms.  Patient with full range of motion neck.  No reproducible neck pain.  Likely trapezius strain as etiology of symptoms.  Possible cervical radiculopathy.  Patient does have appointment with his orthopedist in approximally 2 weeks.  Will give Decadron injection.   Patient has gabapentin already at home.  Patient does have ulcerative colitis.  Discussed risk of NSAIDs with ulcerative colitis.  Will treat symptoms with Robaxin and Norco                        Clinical Impression:   Final diagnoses:  [S48.680V] Strain of left trapezius muscle, initial encounter (Primary)        ED Disposition Condition    Discharge Stable          ED Prescriptions       Medication Sig Dispense Start Date End Date Auth. Provider    methocarbamoL (ROBAXIN) 500 MG Tab Take 2 tablets (1,000 mg total) by mouth 3 (three) times daily as needed. 30 tablet 6/23/2023 6/28/2023 Shiraz Eckert MD    HYDROcodone-acetaminophen (NORCO) 5-325 mg per tablet Take 1 tablet by mouth every 6 (six) hours as needed for Pain. 12 tablet 6/23/2023 -- Shiraz Eckert MD          Follow-up Information       Follow up With Specialties Details Why Contact Info Additional Information    [facility] - Emergency Dept Emergency Medicine  If symptoms worsen [address]  [facility] [id number] [phone] 1st floor             Shiraz Eckert MD  06/23/23 1012

## 2023-06-23 NOTE — ED NOTES
Patient AAOx4, NAD. Patient with c/o left shoulder, upper back pain x 3 weeks that worsened. Now feeling spasms down his left arm.

## 2023-09-13 RX ORDER — LOSARTAN POTASSIUM 50 MG/1
TABLET ORAL
Qty: 90 TABLET | Refills: 3 | OUTPATIENT
Start: 2023-09-13

## 2023-12-12 ENCOUNTER — PATIENT MESSAGE (OUTPATIENT)
Dept: CARDIOLOGY | Facility: CLINIC | Age: 68
End: 2023-12-12
Payer: MEDICARE

## 2024-01-11 RX ORDER — LOSARTAN POTASSIUM 50 MG/1
50 TABLET ORAL DAILY
Qty: 90 TABLET | Refills: 3 | Status: SHIPPED | OUTPATIENT
Start: 2024-01-11

## 2024-02-07 ENCOUNTER — OFFICE VISIT (OUTPATIENT)
Dept: CARDIOLOGY | Facility: CLINIC | Age: 69
End: 2024-02-07
Payer: MEDICARE

## 2024-02-07 VITALS
WEIGHT: 173 LBS | OXYGEN SATURATION: 99 % | SYSTOLIC BLOOD PRESSURE: 134 MMHG | BODY MASS INDEX: 26.22 KG/M2 | DIASTOLIC BLOOD PRESSURE: 80 MMHG | HEART RATE: 85 BPM | HEIGHT: 68 IN

## 2024-02-07 DIAGNOSIS — R07.9 CHEST PAIN, UNSPECIFIED TYPE: Primary | ICD-10-CM

## 2024-02-07 DIAGNOSIS — M25.531 ACUTE PAIN OF RIGHT WRIST: ICD-10-CM

## 2024-02-07 DIAGNOSIS — I10 ESSENTIAL HYPERTENSION: ICD-10-CM

## 2024-02-07 DIAGNOSIS — R13.10 DYSPHAGIA, UNSPECIFIED TYPE: ICD-10-CM

## 2024-02-07 PROCEDURE — 3079F DIAST BP 80-89 MM HG: CPT | Mod: CPTII,S$GLB,, | Performed by: INTERNAL MEDICINE

## 2024-02-07 PROCEDURE — 93000 ELECTROCARDIOGRAM COMPLETE: CPT | Mod: S$GLB,,, | Performed by: INTERNAL MEDICINE

## 2024-02-07 PROCEDURE — 1160F RVW MEDS BY RX/DR IN RCRD: CPT | Mod: CPTII,S$GLB,, | Performed by: INTERNAL MEDICINE

## 2024-02-07 PROCEDURE — 1159F MED LIST DOCD IN RCRD: CPT | Mod: CPTII,S$GLB,, | Performed by: INTERNAL MEDICINE

## 2024-02-07 PROCEDURE — 3075F SYST BP GE 130 - 139MM HG: CPT | Mod: CPTII,S$GLB,, | Performed by: INTERNAL MEDICINE

## 2024-02-07 PROCEDURE — 99214 OFFICE O/P EST MOD 30 MIN: CPT | Mod: S$GLB,,, | Performed by: INTERNAL MEDICINE

## 2024-02-07 PROCEDURE — 99999 PR PBB SHADOW E&M-EST. PATIENT-LVL III: CPT | Mod: PBBFAC,,, | Performed by: INTERNAL MEDICINE

## 2024-02-07 PROCEDURE — 3008F BODY MASS INDEX DOCD: CPT | Mod: CPTII,S$GLB,, | Performed by: INTERNAL MEDICINE

## 2024-02-07 PROCEDURE — 4010F ACE/ARB THERAPY RXD/TAKEN: CPT | Mod: CPTII,S$GLB,, | Performed by: INTERNAL MEDICINE

## 2024-02-07 PROCEDURE — 3288F FALL RISK ASSESSMENT DOCD: CPT | Mod: CPTII,S$GLB,, | Performed by: INTERNAL MEDICINE

## 2024-02-07 PROCEDURE — 1101F PT FALLS ASSESS-DOCD LE1/YR: CPT | Mod: CPTII,S$GLB,, | Performed by: INTERNAL MEDICINE

## 2024-02-07 RX ORDER — OXYMETAZOLINE HCL 0.05 %
2 SPRAY, NON-AEROSOL (ML) NASAL 3 TIMES DAILY
COMMUNITY

## 2024-02-07 RX ORDER — METHOCARBAMOL 500 MG/1
500 TABLET, FILM COATED ORAL
COMMUNITY

## 2024-02-07 RX ORDER — MELOXICAM 15 MG/1
15 TABLET ORAL
COMMUNITY

## 2024-02-07 NOTE — PROGRESS NOTES
Subjective:    Patient ID:  Clifford Tolbert is a 68 y.o. male patient here for evaluation Follow-up (F/u) and Chest Pain (Over three month noticed this symptom )      History of Present Illness:     Is a 68-year-old gentleman with history of atypical chest pains had required esophageal dilatation last year and apparently now has recurrence of symptoms discomfort dysphagia as well.  He has history of ulcerative colitis currently being followed by gastroenterologist as well and he has some back issues seen by orthopedics also patient denies having any arm neck or jaw pain but he is recurrent episodes of atypical chest pains noted at rest.  Occasional heartburn symptoms are noted.  But there is no blood in the stools no black stools noted.  No cough or congestion  Patient has been on [employer] insurance apparently this was not accepted by the [employer] and therefore he could not see me are he is able to come back and have a follow-up evaluation      Review of patient's allergies indicates:   Allergen Reactions    Tetracyclines Hives       Past Medical History:   Diagnosis Date    Depression with anxiety     no longer needs treatment    Neuritis     Peroneal tendinitis     Plantar fasciitis, left     Sciatic nerve pain     Stiffness of right shoulder joint     Trigger finger of left hand 2018    Ulcerative colitis      Past Surgical History:   Procedure Laterality Date    ANKLE LIGAMENT RECONSTRUCTION Left 03/13/2008    bilateral     SHOULDER ARTHROSCOPY W/ LABRAL REPAIR Right 02/08/2017    TRIGGER FINGER RELEASE Left 6/2/2020    Procedure: RELEASE, TRIGGER FINGER;  Surgeon: Demetrius Baez Jr., MD;  Location: [facility];  Service: Orthopedics;  Laterality: Left;    WRIST FUSION Right 01/17/2011    1980 also right wrist fusion     Social History     Tobacco Use    Smoking status: Light Smoker     Types: Pipe    Smokeless tobacco: Never   Substance Use Topics    Alcohol use: Yes     Comment: rarely    Drug use: No     "    Review of Systems:    As noted in HPI in addition      REVIEW OF SYSTEMS  CARDIOVASCULAR:  Occasional intermittent chest pains as described above without radiation to arm neck or jaw palpitations, arm, neck, or jaw pain  RESPIRATORY: No recent fever, cough chills, SOB or congestion  : No blood in the urine  GI: No Nausea, vomiting, constipation, diarrhea, blood, or reflux.  MUSCULOSKELETAL: No myalgias  NEURO: No lightheadedness or dizziness  EYES: No Double vision, blurry, vision or headache              Objective        Vitals:    02/07/24 1440   BP: 134/80   Pulse: 85       LIPIDS - LAST 2   No results found for: "CHOL", "HDL", "LDLCALC", "TRIG", "CHOLHDL"    CBC - LAST 2  Lab Results   Component Value Date    WBC 6.77 04/20/2023    WBC 10.28 05/29/2020    RBC 4.75 04/20/2023    RBC 4.97 05/29/2020    HGB 14.1 04/20/2023    HGB 14.3 05/29/2020    HCT 43.3 04/20/2023    HCT 44.7 05/29/2020    MCV 91 04/20/2023    MCV 90 05/29/2020    MCH 29.7 04/20/2023    MCH 28.8 05/29/2020    MCHC 32.6 04/20/2023    MCHC 32.0 05/29/2020    RDW 14.1 04/20/2023    RDW 13.7 05/29/2020     04/20/2023     05/29/2020    MPV 9.9 04/20/2023    MPV 10.6 05/29/2020    GRAN 3.3 04/20/2023    GRAN 48.4 04/20/2023    LYMPH 2.4 04/20/2023    LYMPH 36.0 04/20/2023    MONO 0.7 04/20/2023    MONO 10.8 04/20/2023    BASO 0.04 04/20/2023    BASO 0.04 05/29/2020    NRBC 0 04/20/2023    NRBC 0 05/29/2020       CHEMISTRY & LIVER FUNCTION - LAST 2  Lab Results   Component Value Date     04/20/2023     11/03/2020    K 4.4 04/20/2023    K 4.9 11/03/2020     04/20/2023     11/03/2020    CO2 27 04/20/2023    CO2 27 11/03/2020    ANIONGAP 10 04/20/2023    ANIONGAP 10 11/03/2020    BUN 21 04/20/2023    BUN 20 11/03/2020    CREATININE 0.9 04/20/2023    CREATININE 1.0 11/03/2020     (H) 04/20/2023    GLU 99 11/03/2020    CALCIUM 9.0 04/20/2023    CALCIUM 9.1 11/03/2020    ALBUMIN 3.6 04/20/2023    ALBUMIN " "3.6 11/03/2020    PROT 7.0 04/20/2023    PROT 6.8 11/03/2020    ALKPHOS 50 (L) 04/20/2023    ALKPHOS 50 (L) 11/03/2020    ALT 18 04/20/2023    ALT 21 11/03/2020    AST 19 04/20/2023    AST 25 11/03/2020    BILITOT 0.6 04/20/2023    BILITOT 0.8 11/03/2020        CARDIAC PROFILE - LAST 2  No results found for: "BNP", "CPK", "CPKMB", "LDH", "TROPONINI", "TROPONINIHS"     COAGULATION - LAST 2  No results found for: "LABPT", "INR", "APTT"    ENDOCRINE & PSA - LAST 2  No results found for: "HGBA1C", "MICROALBUR", "TSH", "PROCAL", "PSA"     ECHOCARDIOGRAM RESULTS  No results found for this or any previous visit.      CURRENT/PREVIOUS VISIT EKG  Results for orders placed or performed during the hospital encounter of 05/29/20   EKG 12-lead    Collection Time: 05/29/20  1:42 PM    Narrative    Test Reason : Z01.818,    Vent. Rate : 066 BPM     Atrial Rate : 066 BPM     P-R Int : 130 ms          QRS Dur : 088 ms      QT Int : 400 ms       P-R-T Axes : 064 069 036 degrees     QTc Int : 419 ms    Normal sinus rhythm  Normal ECG  No previous ECGs available  Confirmed by Ha Savage MD ([id number]) on 6/1/2020 8:45:14 AM    Referred By: LINDSAY SWENSON           Confirmed By:Ha Savage MD     No valid procedures specified.   No results found for this or any previous visit.    No valid procedures specified.    PHYSICAL EXAM  CONSTITUTIONAL: Well built, well nourished in no apparent distress  NECK: no carotid bruit, no JVD  LUNGS: CTA  CHEST WALL: no tenderness  HEART: regular rate and rhythm, S1, S2 normal, no murmur, click, rub or gallop   ABDOMEN: soft, non-tender; bowel sounds normal; no masses,  no organomegaly  EXTREMITIES: Extremities normal, no edema, no calf tenderness noted  NEURO: AAO X 3    I HAVE REVIEWED :    The vital signs, nurses notes, and all the pertinent radiology and labs.    EKG shows sinus rhythm within normal limits    Current Outpatient Medications   Medication Instructions    acetaminophen " (TYLENOL) 500 mg, Oral, Every 6 hours PRN, Prn     AdviL 200 mg, Oral, Every 6 hours PRN    azithromycin (Z-NA) 250 MG tablet Oral    gabapentin (NEURONTIN) 300 mg, Oral, 3 times daily    losartan (COZAAR) 50 mg, Oral, Daily    meloxicam (MOBIC) 15 mg, Oral, As needed (PRN)    mesalamine (LIALDA) 1.2 g, Oral, Daily PRN    methocarbamoL (ROBAXIN) 500 mg, Oral, As needed (PRN)    oxymetazoline (AFRIN) 0.05 % nasal spray 2 sprays, Nasal, 3 times daily, Pt states use 3 - 4 times a week ly    traMADoL (ULTRAM) 50 mg, Oral, Every 6 hours PRN          Assessment & Plan     Chest pain  Intermittent episodes of atypical chest pains are noted.  Probably esophageal related however given the family history is on coronary disease recommend a symptom limited exercise stress test    Essential hypertension  Blood pressure is stable at 130 4/80 mmHg continue on losartan 50 mg once daily.  Maintain on low-salt takes it at nighttime.    Acute pain of right wrist  Status post wrist fusion healed and relatively stable loss of some small muscles    Dysphagia  He would establish care with Dr. Rao   recommend to follow-up with him.          Follow up in about 6 months (around 8/7/2024).

## 2024-02-07 NOTE — ASSESSMENT & PLAN NOTE
Intermittent episodes of atypical chest pains are noted.  Probably esophageal related however given the family history is on coronary disease recommend a symptom limited exercise stress test

## 2024-02-07 NOTE — ASSESSMENT & PLAN NOTE
Blood pressure is stable at 130 4/80 mmHg continue on losartan 50 mg once daily.  Maintain on low-salt takes it at nighttime.

## 2024-02-14 ENCOUNTER — TELEPHONE (OUTPATIENT)
Dept: CARDIOLOGY | Facility: HOSPITAL | Age: 69
End: 2024-02-14

## 2024-02-14 NOTE — TELEPHONE ENCOUNTER
Please arrive for your test at 9:30.  Please disregard any other reminder times.  Please check in at [facility] at the registration desk in the front of the hospital, on the first floor by the parking garage.  You may  take  your medications prior to testing.  .    You may eat a light breakfast if needed to take medications.No caffine 12 hours prior to test.    Wear 2 piece, comfortable clothing and comfortable shoes for walking on the treadmill.    Do not apply lotion, oil, or powders to the upper chest area.  You may wear deodorant if you normally do.  Please call if any additional questions, [phone].

## 2024-02-15 ENCOUNTER — HOSPITAL ENCOUNTER (OUTPATIENT)
Dept: CARDIOLOGY | Facility: HOSPITAL | Age: 69
Discharge: HOME | End: 2024-02-15
Attending: INTERNAL MEDICINE
Payer: MEDICARE

## 2024-02-15 DIAGNOSIS — R07.9 CHEST PAIN, UNSPECIFIED TYPE: ICD-10-CM

## 2024-02-15 DIAGNOSIS — I10 ESSENTIAL HYPERTENSION: ICD-10-CM

## 2024-02-15 PROCEDURE — 93016 CV STRESS TEST SUPVJ ONLY: CPT | Mod: ,,, | Performed by: NURSE PRACTITIONER

## 2024-02-15 PROCEDURE — 93018 CV STRESS TEST I&R ONLY: CPT | Mod: ,,, | Performed by: INTERNAL MEDICINE

## 2024-02-15 PROCEDURE — 93017 CV STRESS TEST TRACING ONLY: CPT

## 2024-02-16 LAB
CV STRESS BASE HR: 74 BPM
DIASTOLIC BLOOD PRESSURE: 78 MMHG
OHS CV CPX 1 MINUTE RECOVERY HEART RATE: 129 BPM
OHS CV CPX 85 PERCENT MAX PREDICTED HEART RATE MALE: 129
OHS CV CPX ESTIMATED METS: 10
OHS CV CPX MAX PREDICTED HEART RATE: 152
OHS CV CPX PATIENT IS FEMALE: 0
OHS CV CPX PATIENT IS MALE: 1
OHS CV CPX PEAK DIASTOLIC BLOOD PRESSURE: 68 MMHG
OHS CV CPX PEAK HEAR RATE: 139 BPM
OHS CV CPX PEAK RATE PRESSURE PRODUCT: NORMAL
OHS CV CPX PEAK SYSTOLIC BLOOD PRESSURE: 162 MMHG
OHS CV CPX PERCENT MAX PREDICTED HEART RATE ACHIEVED: 91
OHS CV CPX RATE PRESSURE PRODUCT PRESENTING: NORMAL
OHS QRS DURATION: 92 MS
OHS QTC CALCULATION: 435 MS
STRESS ECHO POST EXERCISE DUR MIN: 7 MINUTES
STRESS ECHO POST EXERCISE DUR SEC: 1 SECONDS
SYSTOLIC BLOOD PRESSURE: 152 MMHG

## (undated) DEVICE — GUIDE WIRE MOTION .035 X 150CM

## (undated) DEVICE — BOWL STERILE LARGE 32OZ

## (undated) DEVICE — SUTURE ETHILON 4-0 PS-2 18 1667H

## (undated) DEVICE — TRAY UPPER EXTREMITY

## (undated) DEVICE — ALCOHOL 16OZ

## (undated) DEVICE — PREP CHLORA 26ML

## (undated) DEVICE — BANDAGE SOFTBAND 3

## (undated) DEVICE — FORCEP BAYONET 7 20-1370KI

## (undated) DEVICE — GLOVE SURG ULTRA TOUCH 7

## (undated) DEVICE — BANDAGE ESMARK 6X9 55516

## (undated) DEVICE — SPONGE GAUZE 10S 4X4  442214

## (undated) DEVICE — SOL PVP-I SCRUB 7.5% 4OZ

## (undated) DEVICE — GOWN POLY REINF BRTH SLV XL

## (undated) DEVICE — SYR ONLY LUER LOCK 20CC

## (undated) DEVICE — GLOVE BIOGEL PI ORTHO PRO BROWN SZ 8.0

## (undated) DEVICE — Device

## (undated) DEVICE — SYRINGE HYPODERMC LL 22G 1.5 ECLIPSE 30

## (undated) DEVICE — DRAPE 3/4

## (undated) DEVICE — CONTAINER SPECIMEN 4 OZ STERILE 1053

## (undated) DEVICE — DRESSING POST OP MEPILEX AG 4X6  498300

## (undated) DEVICE — SOL IRR NACL .9% 3000ML

## (undated) DEVICE — WATER STERILE INJ 500ML BAG

## (undated) DEVICE — JELLY SURGILUBE LUBE TUBE 2OZ

## (undated) DEVICE — SPONGE BULKEE II ABSRB 6X6.75

## (undated) DEVICE — CATH URTRL OPEN END STR TIP 5F

## (undated) DEVICE — DRAPE T CYSTOSCOPY STERILE

## (undated) DEVICE — SOL WATER STRL IRR 1000ML

## (undated) DEVICE — URETEROSCOPE LITHOVUE STANDARD

## (undated) DEVICE — SHEATH FLEXOR ACCESS 12X35

## (undated) DEVICE — TRAY DRY SPONGE SCRUB W FOAM

## (undated) DEVICE — PAD BOVIE ADULT

## (undated) DEVICE — SPONGE KERLIX SUPER   NON25854

## (undated) DEVICE — BASIN BLUE 32OZ 1232

## (undated) DEVICE — SLEEVE SCD EXPRESS KNEE MEDIUM

## (undated) DEVICE — BAG LINGEMAN DRAIN UROLOGY

## (undated) DEVICE — GUIDEWIRE AMPLATZ .035X145 STR

## (undated) DEVICE — CONTAINER SPECIMEN OR STER 4OZ

## (undated) DEVICE — SET IRR URLGY 2LINE UNIV SPIKE

## (undated) DEVICE — SPONGE GAUZE 16PLY 4X4

## (undated) DEVICE — GLOVE BIOGEL MICRO SURGEON PINK SZ 7

## (undated) DEVICE — ADHESIVE DERMABOND SKIN DNX12

## (undated) DEVICE — GLOVE BIOGEL PI   GOLD SIZE 8

## (undated) DEVICE — BANDAGE ACE 2 STERILE VELCRO REB3112

## (undated) DEVICE — PADDING CAST 3 STERILE 30-320

## (undated) DEVICE — CUFF TOURNIQUET 18DUAL PRT 5921-218-235

## (undated) DEVICE — COVER LIGHT HANDLE LB53

## (undated) DEVICE — COVER MAYO STAND XLG 89602

## (undated) DEVICE — DRESSING TEGADERM 2 1/3 X 2 3/4 TD1002

## (undated) DEVICE — EXTRACTOR STONE 4WR NIT 2.2F 1

## (undated) DEVICE — SEAL UROLOGY

## (undated) DEVICE — SCRUB 10% POVIDONE IODINE 4OZ

## (undated) DEVICE — PADDING CAST 4 STERILE 30-321

## (undated) DEVICE — SYRINGE 10ML 302995

## (undated) DEVICE — CATH URETERAL DUAL LUMEN 10FR

## (undated) DEVICE — SET CYSTO IRRIGATION UNIV SPIK

## (undated) DEVICE — STENT URETERAL UNIV 7FR 28CM: Type: IMPLANTABLE DEVICE | Site: URETER | Status: NON-FUNCTIONAL

## (undated) DEVICE — GOWN POLY REINF BRTH SLV LG

## (undated) DEVICE — SOLUTION IRRI NS BOTTLE 1000ML R5200-01